# Patient Record
Sex: MALE | Race: WHITE | NOT HISPANIC OR LATINO | Employment: OTHER | URBAN - NONMETROPOLITAN AREA
[De-identification: names, ages, dates, MRNs, and addresses within clinical notes are randomized per-mention and may not be internally consistent; named-entity substitution may affect disease eponyms.]

---

## 2020-09-03 LAB — HCV AB SER-ACNC: NEGATIVE

## 2022-06-23 ENCOUNTER — OFFICE VISIT (OUTPATIENT)
Dept: FAMILY MEDICINE CLINIC | Facility: CLINIC | Age: 57
End: 2022-06-23
Payer: COMMERCIAL

## 2022-06-23 ENCOUNTER — TELEPHONE (OUTPATIENT)
Dept: ADMINISTRATIVE | Facility: OTHER | Age: 57
End: 2022-06-23

## 2022-06-23 VITALS
TEMPERATURE: 97.6 F | BODY MASS INDEX: 41.18 KG/M2 | WEIGHT: 278 LBS | OXYGEN SATURATION: 97 % | HEART RATE: 67 BPM | DIASTOLIC BLOOD PRESSURE: 72 MMHG | SYSTOLIC BLOOD PRESSURE: 132 MMHG | RESPIRATION RATE: 18 BRPM | HEIGHT: 69 IN

## 2022-06-23 DIAGNOSIS — R11.0 NAUSEA: ICD-10-CM

## 2022-06-23 DIAGNOSIS — G47.33 OSA (OBSTRUCTIVE SLEEP APNEA): ICD-10-CM

## 2022-06-23 DIAGNOSIS — E03.9 HYPOTHYROIDISM (ACQUIRED): ICD-10-CM

## 2022-06-23 DIAGNOSIS — E78.2 MIXED HYPERLIPIDEMIA: ICD-10-CM

## 2022-06-23 DIAGNOSIS — R68.81 EARLY SATIETY: ICD-10-CM

## 2022-06-23 DIAGNOSIS — I10 PRIMARY HYPERTENSION: ICD-10-CM

## 2022-06-23 DIAGNOSIS — R41.3 MEMORY LOSS: ICD-10-CM

## 2022-06-23 DIAGNOSIS — N20.0 RENAL STONES: ICD-10-CM

## 2022-06-23 DIAGNOSIS — G89.29 CHRONIC BILATERAL LOW BACK PAIN WITHOUT SCIATICA: ICD-10-CM

## 2022-06-23 DIAGNOSIS — Z11.59 ENCOUNTER FOR HEPATITIS C SCREENING TEST FOR LOW RISK PATIENT: ICD-10-CM

## 2022-06-23 DIAGNOSIS — E53.8 B12 DEFICIENCY: ICD-10-CM

## 2022-06-23 DIAGNOSIS — Z11.4 SCREENING FOR HIV WITHOUT PRESENCE OF RISK FACTORS: ICD-10-CM

## 2022-06-23 DIAGNOSIS — R39.11 URINARY HESITANCY: ICD-10-CM

## 2022-06-23 DIAGNOSIS — E11.42 TYPE 2 DIABETES MELLITUS WITH DIABETIC POLYNEUROPATHY, WITHOUT LONG-TERM CURRENT USE OF INSULIN (HCC): Primary | ICD-10-CM

## 2022-06-23 DIAGNOSIS — M54.50 CHRONIC BILATERAL LOW BACK PAIN WITHOUT SCIATICA: ICD-10-CM

## 2022-06-23 LAB
CREAT UR-MCNC: 137 MG/DL
MICROALBUMIN UR-MCNC: 31.5 MG/L (ref 0–20)
MICROALBUMIN/CREAT 24H UR: 23 MG/G CREATININE (ref 0–30)
SL AMB POCT HEMOGLOBIN AIC: 5.7 (ref ?–6.5)

## 2022-06-23 PROCEDURE — 3075F SYST BP GE 130 - 139MM HG: CPT | Performed by: FAMILY MEDICINE

## 2022-06-23 PROCEDURE — 3044F HG A1C LEVEL LT 7.0%: CPT | Performed by: FAMILY MEDICINE

## 2022-06-23 PROCEDURE — 3078F DIAST BP <80 MM HG: CPT | Performed by: FAMILY MEDICINE

## 2022-06-23 PROCEDURE — 82043 UR ALBUMIN QUANTITATIVE: CPT | Performed by: FAMILY MEDICINE

## 2022-06-23 PROCEDURE — 83036 HEMOGLOBIN GLYCOSYLATED A1C: CPT | Performed by: FAMILY MEDICINE

## 2022-06-23 PROCEDURE — 99205 OFFICE O/P NEW HI 60 MIN: CPT | Performed by: FAMILY MEDICINE

## 2022-06-23 PROCEDURE — 4004F PT TOBACCO SCREEN RCVD TLK: CPT | Performed by: FAMILY MEDICINE

## 2022-06-23 PROCEDURE — 3060F POS MICROALBUMINURIA REV: CPT | Performed by: FAMILY MEDICINE

## 2022-06-23 PROCEDURE — 82570 ASSAY OF URINE CREATININE: CPT | Performed by: FAMILY MEDICINE

## 2022-06-23 PROCEDURE — 3008F BODY MASS INDEX DOCD: CPT | Performed by: FAMILY MEDICINE

## 2022-06-23 RX ORDER — CHLORAL HYDRATE 500 MG
CAPSULE ORAL
COMMUNITY
Start: 2021-12-30 | End: 2022-06-23 | Stop reason: ALTCHOICE

## 2022-06-23 RX ORDER — OXYBUTYNIN CHLORIDE 15 MG/1
TABLET, EXTENDED RELEASE ORAL
COMMUNITY
Start: 2022-02-17

## 2022-06-23 RX ORDER — LEVOTHYROXINE SODIUM 88 UG/1
88 TABLET ORAL DAILY
COMMUNITY
Start: 2022-02-17 | End: 2022-06-23 | Stop reason: SDUPTHER

## 2022-06-23 RX ORDER — LEVOTHYROXINE AND LIOTHYRONINE 9.5; 2.25 UG/1; UG/1
TABLET ORAL DAILY
COMMUNITY
Start: 2009-07-20 | End: 2022-06-23 | Stop reason: ALTCHOICE

## 2022-06-23 RX ORDER — SOLIFENACIN SUCCINATE 5 MG/1
TABLET, FILM COATED ORAL DAILY
COMMUNITY
Start: 2009-07-20 | End: 2022-06-23 | Stop reason: ALTCHOICE

## 2022-06-23 RX ORDER — LEVOTHYROXINE SODIUM 88 UG/1
88 TABLET ORAL DAILY
Qty: 90 TABLET | Refills: 1 | Status: SHIPPED | OUTPATIENT
Start: 2022-06-23

## 2022-06-23 RX ORDER — ZOLPIDEM TARTRATE 12.5 MG/1
TABLET, FILM COATED, EXTENDED RELEASE ORAL
COMMUNITY
Start: 2009-07-20 | End: 2022-06-23 | Stop reason: ALTCHOICE

## 2022-06-23 RX ORDER — NABUMETONE 500 MG/1
TABLET, FILM COATED ORAL 2 TIMES DAILY PRN
COMMUNITY
Start: 2009-12-22 | End: 2022-06-23 | Stop reason: ALTCHOICE

## 2022-06-23 RX ORDER — AMLODIPINE BESYLATE AND ATORVASTATIN CALCIUM 10; 40 MG/1; MG/1
TABLET, FILM COATED ORAL DAILY
COMMUNITY
Start: 2009-07-20 | End: 2022-06-23 | Stop reason: SDUPTHER

## 2022-06-23 RX ORDER — ROSUVASTATIN CALCIUM 40 MG/1
40 TABLET, COATED ORAL
Qty: 90 TABLET | Refills: 1 | Status: SHIPPED | OUTPATIENT
Start: 2022-06-23

## 2022-06-23 RX ORDER — ROSUVASTATIN CALCIUM 40 MG/1
40 TABLET, COATED ORAL DAILY
COMMUNITY
Start: 2021-12-30 | End: 2022-06-23 | Stop reason: SDUPTHER

## 2022-06-23 RX ORDER — HYDROCODONE BITARTRATE AND ACETAMINOPHEN 7.5; 325 MG/1; MG/1
1 TABLET ORAL
COMMUNITY

## 2022-06-23 RX ORDER — AMLODIPINE AND OLMESARTAN MEDOXOMIL 5; 20 MG/1; MG/1
1 TABLET ORAL DAILY
Qty: 90 TABLET | Refills: 1 | Status: SHIPPED | OUTPATIENT
Start: 2022-06-23

## 2022-06-23 RX ORDER — AMLODIPINE AND OLMESARTAN MEDOXOMIL 5; 20 MG/1; MG/1
1 TABLET ORAL DAILY
COMMUNITY
Start: 2022-02-17 | End: 2022-06-23 | Stop reason: SDUPTHER

## 2022-06-23 NOTE — PATIENT INSTRUCTIONS
Mediterranean Diet   AMBULATORY CARE:   A Mediterranean diet  is a meal plan that includes foods that are commonly eaten in countries that border the [place]  This meal plan may provide several health benefits  These include losing or maintaining weight, and decreasing blood pressure, blood sugar, and cholesterol levels  It may also help protect against certain health conditions such as heart disease, cancer, type 2 diabetes, and Alzheimer disease  Work with a dietitian to develop a meal plan that is right for you  Foods to include in the [address] [facility] diet:   Include fruits and vegetables in each meal   Eat a variety of fresh fruits and vegetables  Choose whole grains every day  These foods include whole-grain breads, pastas, and cereals  It also includes brown rice, quinoa, and millet  Use unsaturated fats instead of saturated fats  Cook with olive or canola oil  Limit saturated fats, such as butter, margarine, and shortening  Saturated fat is an unhealthy fat that can increase your cholesterol levels  Choose plant foods, poultry, and fish as your main sources of protein  Eat plant-based foods that provide protein,  such as lentils, beans, chickpeas, nuts, and seeds  Choose mostly plant-based foods in place of meat on most days of the week  Eat protein foods high in omega-3 fats  Fish high in omega-3 fats include salmon, trout, and tuna  Include these types of fish 1 or 2 times each week  Limit fish high in mercury, such as shark, swordfish, tilefish, and leilani mackerel  Omega-3 fats are also found in walnuts and flaxseed  Choose poultry (chicken or turkey)  without skin instead of red meat  Red meat is high in saturated fat  Limit eggs and high-fat meats, such as lowe, sausage, and hot dogs  Choose low-fat dairy foods  such as nonfat or 1% milk, or low-fat almond, cashew, or soy milk  Other examples include low-fat cheese, yogurt, and cottage cheese  Limit sweets  Limit your intake of high-sugar foods, such as soda, desserts, and candy  Talk to your healthcare provider about alcohol  Studies have shown that moderate intake of wine may reduce the risk of heart disease  A moderate amount of wine is 1 serving for women and men 65 years and older each day  Two servings is recommended for men 24to 59years of age each day  A serving of wine is 5 ounces  Other things you need to know if you follow the Mediterranean diet:   Include foods high in iron and vitamin C   Plant-based foods that are high in iron include spinach, beans, tofu, and artichoke  Eat a serving of vitamin C with any iron-rich food to help your body absorb more iron  Examples include oranges, strawberries, cantaloupe, broccoli, and yellow peppers  Get regular physical activity  The Mediterranean diet will have the most benefit if you get regular physical activity  Get 30 minutes of physical activity at least 5 days a week  Choose physical activities that increase your heart rate  Examples include walking, hiking, swimming, and riding a bike  Ask your healthcare provider about the best exercise plan for you  © Copyright [employer] 2022 Information is for End User's use only and may not be sold, redistributed or otherwise used for commercial purposes  All illustrations and images included in CareNotes® are the copyrighted property of [employer]  or David Latham  The above information is an  only  It is not intended as medical advice for individual conditions or treatments  Talk to your doctor, nurse or pharmacist before following any medical regimen to see if it is safe and effective for you

## 2022-06-23 NOTE — LETTER
Procedure Request Form: Colonoscopy      Date Requested: 22  Patient: Josefa Favio  Patient : 1965   Referring Provider: Maria Esther Bloom, DO        Date of Procedure ______________________________       The above patient has informed us that they have completed their   most recent Colonoscopy at your facility  Please complete   this form and attach all corresponding procedure reports/results  Comments __________________________________________________________  ____________________________________________________________________  ____________________________________________________________________  ____________________________________________________________________    Facility Completing Procedure _________________________________________    Form Completed By (print name) _______________________________________      Signature __________________________________________________________      These reports are needed for  compliance  Please fax this completed form and a copy of the procedure report to our office located at [place] [address] as soon as possible to [phone] virgil Bedoya: Phone [phone]    We thank you for your assistance in treating our mutual patient

## 2022-06-23 NOTE — PROGRESS NOTES
Assessment/Plan:    1  Type 2 diabetes mellitus with diabetic polyneuropathy, without long-term current use of insulin (HCC)  - A1C today is 5 7  We discussed monitoring for lows  We will dec Ozempic to 0 25 from 0 5  Cont metformin  He is on board with this  We will monitor for hypoglycemic episodes  On ARB therapy and on statin  MCACR today  Can est with eye doctor here, they are new to area and looking around  - Basic metabolic panel; Future  - Microalbumin / creatinine urine ratio  - NM gastric emptying; Future  - metFORMIN (GLUCOPHAGE) 1000 MG tablet; Take 1 tablet (1,000 mg total) by mouth 2 (two) times a day with meals  Dispense: 180 tablet; Refill: 1  - Semaglutide,0 25 or 0 5MG/DOS, 2 MG/1 5ML SOPN; Inject 0 25 mg under the skin once a week  Dispense: 4 5 mL; Refill: 1    2  ELIZA (obstructive sleep apnea)  - will get plugged into sleep clinic  No acute needs  Does well with PAP  - Ambulatory Referral to Sleep Medicine; Future    3  Mixed hyperlipidemia  - on statin  Discussed the importance of maintaining a healthy lifestyle through heart healthy diet and exercise  - rosuvastatin (CRESTOR) 40 MG tablet; Take 1 tablet (40 mg total) by mouth daily with dinner  Dispense: 90 tablet; Refill: 1    4  Primary hypertension  - stable on regimen  Tolerating  BMI Counseling: Body mass index is 41 05 kg/m²  The BMI is above normal  Nutrition recommendations include decreasing portion sizes, encouraging healthy choices of fruits and vegetables, decreasing fast food intake, consuming healthier snacks, limiting drinks that contain sugar, reducing intake of saturated and trans fat and reducing intake of cholesterol  Exercise recommendations include exercising 3-5 times per week and strength training exercises  Rationale for BMI follow-up plan is due to patient being overweight or obese  Tobacco Cessation Counseling: Tobacco cessation counseling was not provided   The patient is sincerely urged to quit consumption of tobacco  He is not ready to quit tobacco      - amlodipine-olmesartan (MANAS) 5-20 MG; Take 1 tablet by mouth daily  Dispense: 90 tablet; Refill: 1    5  Chronic bilateral low back pain without sciatica  - stable  Has been on opioids in the past   Uses [facility] and has a card for this  If needs opioids understands our policy  6  Hypothyroidism (acquired)  - stable  - TSH, 3rd generation with Free T4 reflex; Future  - levothyroxine 88 mcg tablet; Take 1 tablet (88 mcg total) by mouth daily  Dispense: 90 tablet; Refill: 1    7  Memory loss  - will check labs  Will monitor; very mild  Can consider MoCA in the future  - CBC and differential; Future    8  B12 deficiency  - Vitamin B12; Future    9  Renal stones  - Urology referral   Was seeing in [place]; has significant history  - Ambulatory Referral to Urology; Future    10  Early satiety  With nausea over the past couple of months  Is losing weight but moreso of late  Will check a gastric emptying study  No reflux symptoms to report  We can cont to w/u pending findings     - NM gastric emptying; Future  - Lipase; Future    11  Urinary hesitancy  PSA check  He will see urology for stones; can certainly have this addressed as well  Is on oxybutynin but does not take routinely  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 SE of Ozempic     - Lipase; Future    RTC in 3 months to recap given number of items discussed  Patient/Caretaker verbalized understanding and were in agreement with today's assessment and plan  Time was taken to address any questions patient/caretaker had  Indication/Risks/Benefits of medication(s) as prescribed were discussed with the patient/caretaker  The patient verbalized understanding and agreement and elects to take medications as prescribed    Time was taken to answer any questions the patient/caretaker may have had  Will get est with eye care  Total time I spent caring for this patient on the day of the encounter - 60 minutes  15 minutes spent before the visit  30 minutes spent during the visit  15 minutes spent after the visit       Chief Complaint   [facility]   [facility]     NAUSEA ALL THE TIME COMES AND GO A FEW TIME A DAY AND SOME ALL DAY LONG  MEMORY NOT GOOD  Subjective:      Patient ID: Sana Landin is a 59 y o  male  He is with chronic nausea - a couple of months  At times he does feel like he is going to vomit  At times this happens in the middle of eating  No belly pain  No diarrhea/no changes in his stools  Had Colonoscopy a few years ago and tells me this was good for 10 years  Tells me did have EGD as well due to reflux -- he is not longer on medicine for this  Admits to not feeling hungry often and feeling full, quickly  Losing memory a bit more -- worsening since he is retired  Not getting lost driving but lost in conversation  Worse with conversation  He Is not getting lost in public  Just his wife noticed this  There is no FHx of Alzheimer's     Chronic back pain- does [facility] and has card  Used to take opioid but is no longer  He understands he will need CSA if needs Rx for opioid  His pain is stable today  Tells me he had w/u for this in [place]    Diabetes - well controlled, eye exam up-to-date per his report, does need a foot exam, microalbumin negative in the fall  He Is on Ozempic and has lost about 80# since this time  He does tell me since he is with nausea, he is having a bit more nausea  Hypertension-on meds - tolerating this  HLD - on statin and tolerating this  No chest pain, shortness of breath, lightheadedness, or exercise intolerance  Insomnia-used to work 3rd shift, had Ambien to allow him to sleep during the day, does not take anymore  Was a         ELIZA- on CPAP    Hypothyroidism-No hair or skin changes, diarrhea or constipation, weight changes for no reason  Last TSH normal    H/o elev LFTs - fatty liver per report  Osteoarthritis of both knees- used to get injections, doing well currently       The following portions of the patient's history were reviewed and updated as appropriate: allergies, current medications, past family history, past medical history, past social history, past surgical history and problem list     Review of Systems   All other systems reviewed and are negative  Objective:      /72 (BP Location: Left arm, Patient Position: Sitting, Cuff Size: Large)   Pulse 67   Temp 97 6 °F (36 4 °C) (Temporal)   Resp 18   Ht 5' 9" (1 753 m)   Wt 126 kg (278 lb)   SpO2 97%   BMI 41 05 kg/m²          Physical Exam  Vitals and nursing note reviewed  Constitutional:       General: He is not in acute distress  Appearance: Normal appearance  He is obese  He is not ill-appearing  HENT:      Head: Normocephalic and atraumatic  Eyes:      Conjunctiva/sclera: Conjunctivae normal    Cardiovascular:      Rate and Rhythm: Normal rate and regular rhythm  Pulmonary:      Effort: Pulmonary effort is normal       Breath sounds: Normal breath sounds  No wheezing, rhonchi or rales  Musculoskeletal:      Cervical back: Neck supple  Skin:     General: Skin is warm and dry  Neurological:      General: No focal deficit present  Mental Status: He is alert and oriented to person, place, and time  Psychiatric:         Mood and Affect: Mood normal          Behavior: Behavior normal          Thought Content:  Thought content normal          Judgment: Judgment normal

## 2022-06-23 NOTE — TELEPHONE ENCOUNTER
----- Message from Rosa Linda sent at 6/23/2022  9:37 AM EDT -----  Regarding: Care Gap Request  06/23/22 9:37 AM    Hello, our patient attached above has had colonoscopy completed/performed  Please assist in updating the patient chart   The date of service is 2/2020, under care everywhere    Thank you,  Rosa Linda  [facility]

## 2022-06-24 NOTE — TELEPHONE ENCOUNTER
Upon review of the In Basket request and the patient's chart, initial outreach has been made via fax, please see Contacts section for details       Thank you  Paola Ricks

## 2022-07-06 NOTE — TELEPHONE ENCOUNTER
Upon review of the In Basket request we were able to locate, review, and update the patient chart as requested for CRC: Colonoscopy  Any additional questions or concerns should be emailed to the Practice Liaisons via [email or web address] email, please do not reply via In Basket      Thank you  Jremain Kelly

## 2022-07-20 ENCOUNTER — HOSPITAL ENCOUNTER (OUTPATIENT)
Dept: NUCLEAR MEDICINE | Facility: HOSPITAL | Age: 57
Discharge: HOME | End: 2022-07-20
Attending: FAMILY MEDICINE
Payer: COMMERCIAL

## 2022-07-20 DIAGNOSIS — R68.81 EARLY SATIETY: ICD-10-CM

## 2022-07-20 DIAGNOSIS — E11.42 TYPE 2 DIABETES MELLITUS WITH DIABETIC POLYNEUROPATHY, WITHOUT LONG-TERM CURRENT USE OF INSULIN (HCC): ICD-10-CM

## 2022-07-20 PROCEDURE — 78264 GASTRIC EMPTYING IMG STUDY: CPT

## 2022-07-20 PROCEDURE — A9541 TC99M SULFUR COLLOID: HCPCS

## 2022-07-22 ENCOUNTER — TELEPHONE (OUTPATIENT)
Dept: FAMILY MEDICINE CLINIC | Facility: CLINIC | Age: 57
End: 2022-07-22

## 2022-07-22 NOTE — TELEPHONE ENCOUNTER
Pt is aware he that the nausea started before he started taking his ozempic, but he will discuss at f/u appt

## 2022-07-23 ENCOUNTER — TELEPHONE (OUTPATIENT)
Dept: OTHER | Facility: OTHER | Age: 57
End: 2022-07-23

## 2022-07-23 NOTE — TELEPHONE ENCOUNTER
[facility] calling to report a critical lab value ordered by Dr Pao Carolina, states that he must give call back number and reference to give to provider directly  Reference#:[id number]    Paged on call via TC

## 2022-07-24 LAB
BASOPHILS # BLD AUTO: 39 CELLS/UL (ref 0–200)
BASOPHILS NFR BLD AUTO: 0.5 %
BUN SERPL-MCNC: 13 MG/DL (ref 7–25)
BUN/CREAT SERPL: 21 (CALC) (ref 6–22)
CALCIUM SERPL-MCNC: 9.3 MG/DL (ref 8.6–10.3)
CHLORIDE SERPL-SCNC: 106 MMOL/L (ref 98–110)
CO2 SERPL-SCNC: 26 MMOL/L (ref 20–32)
CREAT SERPL-MCNC: 0.62 MG/DL (ref 0.7–1.3)
EOSINOPHIL # BLD AUTO: 94 CELLS/UL (ref 15–500)
EOSINOPHIL NFR BLD AUTO: 1.2 %
ERYTHROCYTE [DISTWIDTH] IN BLOOD BY AUTOMATED COUNT: 13.1 % (ref 11–15)
GFR/BSA.PRED SERPLBLD CYS-BASED-ARV: 110 ML/MIN/1.73M2
GLUCOSE SERPL-MCNC: 93 MG/DL (ref 65–99)
HCT VFR BLD AUTO: 42.9 % (ref 38.5–50)
HGB BLD-MCNC: 14.3 G/DL (ref 13.2–17.1)
LIPASE SERPL-CCNC: 224 U/L (ref 7–60)
LYMPHOCYTES # BLD AUTO: 2496 CELLS/UL (ref 850–3900)
LYMPHOCYTES NFR BLD AUTO: 32 %
MCH RBC QN AUTO: 31.6 PG (ref 27–33)
MCHC RBC AUTO-ENTMCNC: 33.3 G/DL (ref 32–36)
MCV RBC AUTO: 94.9 FL (ref 80–100)
MONOCYTES # BLD AUTO: 585 CELLS/UL (ref 200–950)
MONOCYTES NFR BLD AUTO: 7.5 %
NEUTROPHILS # BLD AUTO: 4586 CELLS/UL (ref 1500–7800)
NEUTROPHILS NFR BLD AUTO: 58.8 %
PLATELET # BLD AUTO: 248 THOUSAND/UL (ref 140–400)
PMV BLD REES-ECKER: 9.8 FL (ref 7.5–12.5)
POTASSIUM SERPL-SCNC: 4.7 MMOL/L (ref 3.5–5.3)
PSA FREE MFR SERPL: 20 % (CALC)
PSA FREE SERPL-MCNC: 0.1 NG/ML
PSA SERPL-MCNC: 0.5 NG/ML
RBC # BLD AUTO: 4.52 MILLION/UL (ref 4.2–5.8)
SODIUM SERPL-SCNC: 139 MMOL/L (ref 135–146)
TSH SERPL-ACNC: 1.75 MIU/L (ref 0.4–4.5)
VIT B12 SERPL-MCNC: 799 PG/ML (ref 200–1100)
WBC # BLD AUTO: 7.8 THOUSAND/UL (ref 3.8–10.8)

## 2022-07-25 ENCOUNTER — TELEPHONE (OUTPATIENT)
Dept: ADMINISTRATIVE | Facility: OTHER | Age: 57
End: 2022-07-25

## 2022-07-25 NOTE — TELEPHONE ENCOUNTER
Upon review of the In Basket request we were able to locate, review, and update the patient chart as requested for Hepatitis C   Any additional questions or concerns should be emailed to the Practice Liaisons via [email or web address] email, please do not reply via In Basket      Thank you  Tanner Rios MA

## 2022-07-25 NOTE — TELEPHONE ENCOUNTER
----- Message from Lambert Nowak sent at 7/24/2022  7:55 AM EDT -----  Regarding: Care Gap Request  07/24/22 7:55 AM    Hello, our patient attached above has had Hepatitis C completed/performed  Please assist in updating the patient chart by pulling the Care Everywhere (CE) document  The date of service is 2021           Thank you,  Jerry Whitley MA  [facility] PRIMARY CARE

## 2022-07-26 ENCOUNTER — TELEPHONE (OUTPATIENT)
Dept: FAMILY MEDICINE CLINIC | Facility: CLINIC | Age: 57
End: 2022-07-26

## 2022-07-26 NOTE — TELEPHONE ENCOUNTER
Lab Results   Component Value Date    HGBA1C 5 7 06/23/2022     This was checked in-house at his visit with me and was 5 7 which was the reason we decreased his Ozempic        Gatito Rogers DO

## 2022-08-01 ENCOUNTER — TELEPHONE (OUTPATIENT)
Dept: FAMILY MEDICINE CLINIC | Facility: CLINIC | Age: 57
End: 2022-08-01

## 2022-09-14 ENCOUNTER — TELEPHONE (OUTPATIENT)
Dept: FAMILY MEDICINE CLINIC | Facility: CLINIC | Age: 57
End: 2022-09-14

## 2022-09-30 ENCOUNTER — OFFICE VISIT (OUTPATIENT)
Dept: FAMILY MEDICINE CLINIC | Facility: CLINIC | Age: 57
End: 2022-09-30
Payer: COMMERCIAL

## 2022-09-30 VITALS
HEART RATE: 80 BPM | DIASTOLIC BLOOD PRESSURE: 88 MMHG | HEIGHT: 69 IN | WEIGHT: 289 LBS | OXYGEN SATURATION: 96 % | BODY MASS INDEX: 42.8 KG/M2 | TEMPERATURE: 98 F | SYSTOLIC BLOOD PRESSURE: 138 MMHG | RESPIRATION RATE: 18 BRPM

## 2022-09-30 DIAGNOSIS — L81.8 HEMOSIDERIN PIGMENTATION OF LOWER EXTREMITY DUE TO VARICOSE VEINS: ICD-10-CM

## 2022-09-30 DIAGNOSIS — Z11.4 SCREENING FOR HIV (HUMAN IMMUNODEFICIENCY VIRUS): ICD-10-CM

## 2022-09-30 DIAGNOSIS — K85.30 DRUG-INDUCED ACUTE PANCREATITIS, UNSPECIFIED COMPLICATION STATUS: Primary | ICD-10-CM

## 2022-09-30 DIAGNOSIS — R41.3 MEMORY LOSS: ICD-10-CM

## 2022-09-30 DIAGNOSIS — Z00.00 ANNUAL PHYSICAL EXAM: ICD-10-CM

## 2022-09-30 DIAGNOSIS — E03.9 HYPOTHYROIDISM (ACQUIRED): ICD-10-CM

## 2022-09-30 DIAGNOSIS — E78.2 MIXED HYPERLIPIDEMIA: ICD-10-CM

## 2022-09-30 DIAGNOSIS — E11.42 TYPE 2 DIABETES MELLITUS WITH DIABETIC POLYNEUROPATHY, WITHOUT LONG-TERM CURRENT USE OF INSULIN (HCC): ICD-10-CM

## 2022-09-30 DIAGNOSIS — I83.899 HEMOSIDERIN PIGMENTATION OF LOWER EXTREMITY DUE TO VARICOSE VEINS: ICD-10-CM

## 2022-09-30 DIAGNOSIS — I10 PRIMARY HYPERTENSION: ICD-10-CM

## 2022-09-30 DIAGNOSIS — R79.89 ELEVATED LFTS: ICD-10-CM

## 2022-09-30 LAB — SL AMB POCT HEMOGLOBIN AIC: 5.9 (ref ?–6.5)

## 2022-09-30 PROCEDURE — 83036 HEMOGLOBIN GLYCOSYLATED A1C: CPT | Performed by: FAMILY MEDICINE

## 2022-09-30 PROCEDURE — 99214 OFFICE O/P EST MOD 30 MIN: CPT | Performed by: FAMILY MEDICINE

## 2022-09-30 PROCEDURE — 99396 PREV VISIT EST AGE 40-64: CPT | Performed by: FAMILY MEDICINE

## 2022-09-30 RX ORDER — LEVOTHYROXINE SODIUM 88 UG/1
88 TABLET ORAL DAILY
Qty: 90 TABLET | Refills: 1 | Status: SHIPPED | OUTPATIENT
Start: 2022-09-30

## 2022-09-30 RX ORDER — ROSUVASTATIN CALCIUM 40 MG/1
40 TABLET, COATED ORAL
Qty: 90 TABLET | Refills: 1 | Status: SHIPPED | OUTPATIENT
Start: 2022-09-30

## 2022-09-30 RX ORDER — AMLODIPINE AND OLMESARTAN MEDOXOMIL 5; 20 MG/1; MG/1
1 TABLET ORAL DAILY
Qty: 90 TABLET | Refills: 1 | Status: SHIPPED | OUTPATIENT
Start: 2022-09-30

## 2022-09-30 NOTE — PATIENT INSTRUCTIONS

## 2022-09-30 NOTE — PROGRESS NOTES
[facility] PRACT[facility]    NAME: Satish Deshpande  AGE: 59 y o  SEX: male  : 1965     DATE: 10/2/2022     Assessment and Plan:     1  Type 2 diabetes mellitus with diabetic polyneuropathy, without long-term current use of insulin ([address] )  - he is OFF Ozempic due to Pancreatitis  We will also stop his metformin  Cont to have have nausea and A1C is supberb  He will be diet controlled, we will need to closely monitor this  Narda Laughter is utd  On ARB and Statin therapy  Has Diab Eye exam coming up in the near future  Lab Results   Component Value Date    HGBA1C 5 9 2022   - POCT hemoglobin A1c  - Comprehensive metabolic panel; Future    2  Drug-induced acute pancreatitis, unspecified complication status  - will recheck labs  He is feeling close to his baseline but still with some nausea  Is no longer losing weight at a rapid pace, has gained  He agrees to recheck labs  He did not have abdominal pain, just nausea, fatigue, significant weight loss with the medication  Will further discuss plan, pending     - Lipase; Future    3  Mixed hyperlipidemia  Discussed the importance of maintaining a healthy lifestyle through heart healthy diet and exercise  - Lipid Panel with Direct LDL reflex; Future  - rosuvastatin (CRESTOR) 40 MG tablet; Take 1 tablet (40 mg total) by mouth daily with dinner  Dispense: 90 tablet; Refill: 1    4  Primary hypertension  - cont regimen  Discussed the importance of maintaining a healthy lifestyle through heart healthy diet and exercise  - Comprehensive metabolic panel; Future  - amlodipine-olmesartan (MANAS) 5-20 MG; Take 1 tablet by mouth daily  Dispense: 90 tablet; Refill: 1    5  Screening for HIV (human immunodeficiency virus)  - HIV 1/2 Antigen/Antibody (4th Generation) w Reflex [facility]; Future    6  Memory loss  - his wife is worried about this    In lieu of time today, will have him RTC for a cognitive evaluation  Will do additional labs as well  7  Elevated LFTs  - will recheck and pursue w/u pending findings  8  Hemosiderin pigmentation of lower extremity due to varicose veins  Reassurance given  Will monitor this  9  Hypothyroidism (acquired)  Stable  - levothyroxine 88 mcg tablet; Take 1 tablet (88 mcg total) by mouth daily  Dispense: 90 tablet; Refill: 1    10  Annual physical exam  - brought utd based on shared decision making  RTC for cognitive evaluation  Immunizations and preventive care screenings were discussed with patient today  Appropriate education was printed on patient's after visit summary  Counseling:  Alcohol/drug use: discussed moderation in alcohol intake, the recommendations for healthy alcohol use, and avoidance of illicit drug use  Dental Health: discussed importance of regular tooth brushing, flossing, and dental visits  Injury prevention: discussed safety/seat belts, safety helmets, smoke detectors, carbon dioxide detectors, and smoking near bedding or upholstery  Sexual health: discussed sexually transmitted diseases, partner selection, use of condoms, avoidance of unintended pregnancy, and contraceptive alternatives  · Exercise: the importance of regular exercise/physical activity was discussed  Recommend exercise 3-5 times per week for at least 30 minutes  BMI Counseling: Body mass index is 42 68 kg/m²  The BMI is above normal  Nutrition recommendations include decreasing portion sizes, encouraging healthy choices of fruits and vegetables, decreasing fast food intake, consuming healthier snacks, limiting drinks that contain sugar, reducing intake of saturated and trans fat and reducing intake of cholesterol  Exercise recommendations include exercising 3-5 times per week and strength training exercises  Rationale for BMI follow-up plan is due to patient being overweight or obese       Depression Screening and Follow-up Plan: Patient was screened for depression during today's encounter  They screened negative with a PHQ-2 score of 0  Tobacco Cessation Counseling: Tobacco cessation counseling was provided  The patient is sincerely urged to quit consumption of tobacco  He is not ready to quit tobacco  He is smoking cigars and MINIMALLY         Return for needs 30 min visit for cognitive testing -- memory loss when they want to schedule it   Chief Complaint:     Chief Complaint   Patient presents with    Follow-up      History of Present Illness:     Adult Annual Physical     Patient here for a comprehensive physical exam  The patient reports:      He cont to have nausea but this is 75% better than when on Ozempic  We stopped this at the end of July, after his pancreatic enzyme returned  He has no abdominal pain, no longer losing weight  His appetite is much better since being off this medicine  Still taking metformin 1000 mg po bid  Type 2 diabetes mellitus with diabetic polyneuropathy, without long-term current use of insulin ([facility])  - A1C in June was 5 7  STOPPED Ozempic  His BG, when he checks, is mainly less than 100 or low 100s  - eye exam is in 2 weeks   Conts on metformin coming here today  Is taking stating and ARB therapy  ELIZA (obstructive sleep apnea)  - has appt upcoming  He cont on his machine  Mixed hyperlipidemia  - on statin - he is tolerating this  He is watching his diet for the most part  He is not intentionally exercising  He is doing his ADLs  He has a bad back and has to rest with this  Primary hypertension  - stable on regimen  No cp, sob, edema      H/o Renal stones - passed one recently without issues  Urology referral placed in June  Has not gotten set up with them  Will need to see where we are on the status of this  Tobacco -- occasional cigars -- down from 4/day  Alcohol --  none     Chronic bilateral low back pain without sciatica  - stable    He is not taking anything for his pain  Wife uses medical [facility], but he does not  Hypothyroidism (acquired)  - stable  B12 deficiency  - taking supplement  Vitamin B12; Future     Urinary hesitancy  PSA checked  OK  Needs to see urology  Used to take oxybutynin but no longer does  Was much more helpful when he was   He is retired  Fatty Liver Dz -- elev LFTs  - tells me where he came from, they wanted to do a further w/u for this, he did not end up pursuing this  Diet and Physical Activity  · Diet/Nutrition: needs to work on this  · Exercise: no formal exercise  Depression Screening  PHQ-2/9 Depression Screening    Little interest or pleasure in doing things: 0 - not at all  Feeling down, depressed, or hopeless: 0 - not at all  Trouble falling or staying asleep, or sleeping too much: 0 - not at all  Feeling tired or having little energy: 0 - not at all  Poor appetite or overeatin - not at all  Feeling bad about yourself - or that you are a failure or have let yourself or your family down: 0 - not at all  Trouble concentrating on things, such as reading the newspaper or watching television: 0 - not at all  Moving or speaking so slowly that other people could have noticed  Or the opposite - being so fidgety or restless that you have been moving around a lot more than usual: 0 - not at all  Thoughts that you would be better off dead, or of hurting yourself in some way: 0 - not at all  PHQ-2 Score: 0  PHQ-2 Interpretation: Negative depression screen  PHQ-9 Score: 0   PHQ-9 Interpretation: No or Minimal depression        General Health  · Sleep: sleeps well  · Hearing: no major issues  · Vision: vision problems: has glasses  has upcoming appt  · Dental: taking care of his teeth and seeing dentist       Health  · Symptoms include: weak stream, some hesitancy  Review of Systems:     Review of Systems   All other systems reviewed and are negative       Past Medical History:     Past Medical History:   Diagnosis Date    Chronic bilateral low back pain without sciatica     Cigar smoker     Fatty liver     Hypertension     Memory change     Mixed hyperlipidemia     Non-insulin dependent type 2 diabetes mellitus ([address] )     Obstructive sleep apnea       Past Surgical History:     History reviewed  No pertinent surgical history  Family History:     Family History   Problem Relation Age of Onset    Stroke Mother     Heart disease Father       Social History:     Social History     Socioeconomic History    Marital status: /Civil Union     Spouse name: None    Number of children: None    Years of education: None    Highest education level: None   Occupational History    None   Tobacco Use    Smoking status: Current Every Day Smoker     Types: Cigars    Smokeless tobacco: Never Used   Vaping Use    Vaping Use: Never used   Substance and Sexual Activity    Alcohol use: Not Currently    Drug use: Not Currently     Types: Marijuana    Sexual activity: Yes     Partners: Female     Comment:    Other Topics Concern    None   Social History Narrative    None     Social Determinants of Health     Financial Resource Strain: Low Risk     Difficulty of Paying Living Expenses: Not hard at all   Food Insecurity: No Food Insecurity    Worried About Running Out of Food in the Last Year: Never true    Artis of Food in the Last Year: Never true   Transportation Needs: No Transportation Needs    Lack of Transportation (Medical): No    Lack of Transportation (Non-Medical): No   Physical Activity: Inactive    Days of Exercise per Week: 5 days    Minutes of Exercise per Session: 0 min   Stress: No Stress Concern Present    Feeling of Stress : Not at all   Social Connections:  Moderately Isolated    Frequency of Communication with Friends and Family: More than three times a week    Frequency of Social Gatherings with Friends and Family: Once a week    Attends Voodoo Services: Never    Active Member of Clubs or Organizations: No    Attends Club or Organization Meetings: Never    Marital Status:    Intimate Partner Violence: Not At Risk    Fear of Current or Ex-Partner: No    Emotionally Abused: No    Physically Abused: No    Sexually Abused: No   Housing Stability: Low Risk     Unable to Pay for Housing in the Last Year: No    Number of [place] in the Last Year: 1    Unstable Housing in the Last Year: No      Current Medications:     Current Outpatient Medications   Medication Sig Dispense Refill    amlodipine-olmesartan (MANAS) 5-20 MG Take 1 tablet by mouth daily 90 tablet 1    Cholecalciferol 50 MCG (2000 UT) CAPS Take by mouth daily      cyanocobalamin (VITAMIN B-12) 100 MCG tablet Take by mouth      levothyroxine 88 mcg tablet Take 1 tablet (88 mcg total) by mouth daily 90 tablet 1    Multiple Vitamin (Multi-Vitamin) tablet Take 1 tablet by mouth daily      rosuvastatin (CRESTOR) 40 MG tablet Take 1 tablet (40 mg total) by mouth daily with dinner 90 tablet 1     No current facility-administered medications for this visit  Allergies: Allergies   Allergen Reactions    Codeine Other (See Comments)     Reaction Date: [id number];     Morphine And Related GI Intolerance     nauseated  Other reaction(s): GI Intolerance  Other reaction(s): GI Intolerance        Physical Exam:     /88 (BP Location: Right arm, Patient Position: Sitting, Cuff Size: Large)   Pulse 80   Temp 98 °F (36 7 °C) (Temporal)   Resp 18   Ht 5' 9" (1 753 m)   Wt 131 kg (289 lb)   SpO2 96%   BMI 42 68 kg/m²     Physical Exam  Vitals and nursing note reviewed  Constitutional:       Appearance: Normal appearance  He is obese  HENT:      Head: Normocephalic and atraumatic        Right Ear: Tympanic membrane and ear canal normal       Left Ear: Tympanic membrane and ear canal normal       Nose: Nose normal       Mouth/Throat:      Mouth: Mucous membranes are moist       Pharynx: Oropharynx is clear  Eyes:      Conjunctiva/sclera: Conjunctivae normal       Pupils: Pupils are equal, round, and reactive to light  Cardiovascular:      Rate and Rhythm: Normal rate and regular rhythm  Pulses: no weak pulses          Dorsalis pedis pulses are 2+ on the right side and 2+ on the left side  Pulmonary:      Effort: Pulmonary effort is normal       Breath sounds: Normal breath sounds  No wheezing, rhonchi or rales  Abdominal:      General: Bowel sounds are normal       Palpations: Abdomen is soft  Musculoskeletal:      Cervical back: Neck supple  Feet:      Right foot:      Skin integrity: No ulcer, skin breakdown, erythema, warmth, callus or dry skin  Left foot:      Skin integrity: Callus (L great toe) present  No ulcer, skin breakdown, erythema, warmth or dry skin  Lymphadenopathy:      Cervical: No cervical adenopathy  Skin:     General: Skin is warm and dry  Neurological:      General: No focal deficit present  Mental Status: He is alert and oriented to person, place, and time  Psychiatric:         Mood and Affect: Mood normal          Behavior: Behavior normal          Thought Content: Thought content normal          Judgment: Judgment normal           Diabetic Foot Exam    Patient's shoes and socks removed  Right Foot/Ankle   Right Foot Inspection  Skin Exam: skin normal and skin intact  No dry skin, no warmth, no callus, no erythema, no maceration, no abnormal color, no pre-ulcer, no ulcer and no callus  Toe Exam: ROM and strength within normal limits  Sensory   Monofilament testing: diminished    Vascular  Capillary refills: < 3 seconds  The right DP pulse is 2+  Left Foot/Ankle  Left Foot Inspection  Skin Exam: skin normal, skin intact and callus (L great toe)  No dry skin, no warmth, no erythema, no maceration, normal color, no pre-ulcer and no ulcer  Toe Exam: ROM and strength within normal limits       Sensory Monofilament testing: diminished    Vascular  Capillary refills: < 3 seconds  The left DP pulse is 2+       Assign Risk Category  No deformity present  Loss of protective sensation  No weak pulses  Risk: 1      DO Jimmy Ratliff

## 2022-10-05 LAB
ALBUMIN SERPL-MCNC: 4.3 G/DL (ref 3.6–5.1)
ALBUMIN/GLOB SERPL: 1.5 (CALC) (ref 1–2.5)
ALP SERPL-CCNC: 95 U/L (ref 35–144)
ALT SERPL-CCNC: 68 U/L (ref 9–46)
AST SERPL-CCNC: 53 U/L (ref 10–35)
BILIRUB SERPL-MCNC: 0.5 MG/DL (ref 0.2–1.2)
BUN SERPL-MCNC: 13 MG/DL (ref 7–25)
BUN/CREAT SERPL: 21 (CALC) (ref 6–22)
CALCIUM SERPL-MCNC: 9.6 MG/DL (ref 8.6–10.3)
CHLORIDE SERPL-SCNC: 104 MMOL/L (ref 98–110)
CHOLEST SERPL-MCNC: 130 MG/DL
CHOLEST/HDLC SERPL: 3 (CALC)
CO2 SERPL-SCNC: 28 MMOL/L (ref 20–32)
CREAT SERPL-MCNC: 0.61 MG/DL (ref 0.7–1.3)
GFR/BSA.PRED SERPLBLD CYS-BASED-ARV: 111 ML/MIN/1.73M2
GLOBULIN SER CALC-MCNC: 2.9 G/DL (CALC) (ref 1.9–3.7)
GLUCOSE SERPL-MCNC: 125 MG/DL (ref 65–99)
HDLC SERPL-MCNC: 43 MG/DL
LDLC SERPL CALC-MCNC: 66 MG/DL (CALC)
LIPASE SERPL-CCNC: 24 U/L (ref 7–60)
NONHDLC SERPL-MCNC: 87 MG/DL (CALC)
POTASSIUM SERPL-SCNC: 4.5 MMOL/L (ref 3.5–5.3)
PROT SERPL-MCNC: 7.2 G/DL (ref 6.1–8.1)
SODIUM SERPL-SCNC: 139 MMOL/L (ref 135–146)
TRIGL SERPL-MCNC: 133 MG/DL

## 2022-10-12 LAB
LEFT EYE DIABETIC RETINOPATHY: NORMAL
LEFT EYE DIABETIC RETINOPATHY: NORMAL
RIGHT EYE DIABETIC RETINOPATHY: NORMAL
RIGHT EYE DIABETIC RETINOPATHY: NORMAL
SEVERITY (EYE EXAM): NORMAL

## 2022-10-19 ENCOUNTER — OFFICE VISIT (OUTPATIENT)
Dept: FAMILY MEDICINE CLINIC | Facility: CLINIC | Age: 57
End: 2022-10-19
Payer: COMMERCIAL

## 2022-10-19 VITALS
WEIGHT: 292 LBS | DIASTOLIC BLOOD PRESSURE: 70 MMHG | BODY MASS INDEX: 43.25 KG/M2 | TEMPERATURE: 98.8 F | SYSTOLIC BLOOD PRESSURE: 124 MMHG | RESPIRATION RATE: 18 BRPM | HEART RATE: 72 BPM | HEIGHT: 69 IN | OXYGEN SATURATION: 96 %

## 2022-10-19 DIAGNOSIS — R68.89 FORGETFULNESS: Primary | ICD-10-CM

## 2022-10-19 DIAGNOSIS — E11.42 TYPE 2 DIABETES MELLITUS WITH DIABETIC POLYNEUROPATHY, WITHOUT LONG-TERM CURRENT USE OF INSULIN (HCC): ICD-10-CM

## 2022-10-19 DIAGNOSIS — K76.0 FATTY LIVER DISEASE, NONALCOHOLIC: ICD-10-CM

## 2022-10-19 PROBLEM — R79.89 ELEVATED LFTS: Status: RESOLVED | Noted: 2022-09-30 | Resolved: 2022-10-19

## 2022-10-19 PROCEDURE — 99214 OFFICE O/P EST MOD 30 MIN: CPT | Performed by: FAMILY MEDICINE

## 2022-10-19 NOTE — PROGRESS NOTES
Name: Morena Costello      : 1965      MRN: [id number]  Encounter Provider: Nettie Bailey DO  Encounter Date: 10/19/2022   Encounter department: [address] [place] [address]     1  Type 2 diabetes mellitus with diabetic polyneuropathy, without long-term current use of insulin (HCC)  - stable  Has done well  Is back on metformin as his BG were running higher than he would like  Ozempic stopped --> Pancreatitis  Lab Results   Component Value Date    HGBA1C 5 9 2022       2  Fatty liver disease, nonalcoholic  Will monitor  Can get US if worsening  Lifestyle modifications emphasized  3  Forgetfulness  - MOCA today    We discussed slowing down, being in the moment, making lists, not relying on his wife as much for things, taking some ownership of appts, etc   He has crossword puzzles and other brain stimulating things that his wife got him  Labs have looked OK too  No focal neurological deficits  Not on meds that should cause memory impairment  We will monitor this closely and can redo testing/administer other screens, if necessary  RTC in 3 months for CDM, sooner prn    Patient/Caretaker verbalized understanding and were in agreement with today's assessment and plan  Time was taken to address any questions patient/caretaker had  Indication/Risks/Benefits of medication(s) as prescribed were discussed with the patient/caretaker  The patient verbalized understanding and agreement and elects to take medications as prescribed  Time was taken to answer any questions the patient/caretaker may have had  Total time I spent caring for this patient on the day of the encounter - 40 minutes  5 minutes spent before the visit  30 minutes spent during the visit  5 minutes spent after the visit        Chief Complaint   Patient presents with   • Follow-up     Tested for memory loss        Subjective      Here today for cognitive evaluation  He is becoming more forgetful, over time  He has 12th grade education  He relies on his wife to remind him of daily activities  He tells me he does not forget major things but will forget things mid sentence, short term recall, what he might have coming up in terms of activities, get to the store - forget why he went, not remember to look at his list, etc   He admits to rushing, often, not always being in the moment  He is retired  Had similar issues when working, never struggled to do his job  Was a , never got lost   Cont to know where he is, where he is going, etc   Does not forget names of close loved ones; etc   His wife bought him [employer] and other brain stimulating books which he does  He and his wife are very close  Moved here not long ago from [place]  He does have DM, this is very well controlled  Is compliant with labs/meds, etc       Review of Systems   All other systems reviewed and are negative  Current Outpatient Medications on File Prior to Visit   Medication Sig   • amlodipine-olmesartan (MANAS) 5-20 MG Take 1 tablet by mouth daily   • Cholecalciferol 50 MCG (2000 UT) CAPS Take by mouth daily   • cyanocobalamin (VITAMIN B-12) 100 MCG tablet Take by mouth   • levothyroxine 88 mcg tablet Take 1 tablet (88 mcg total) by mouth daily   • metFORMIN (GLUCOPHAGE) 1000 MG tablet Take 1,000 mg by mouth 2 (two) times a day with meals   • Multiple Vitamin (Multi-Vitamin) tablet Take 1 tablet by mouth daily   • rosuvastatin (CRESTOR) 40 MG tablet Take 1 tablet (40 mg total) by mouth daily with dinner       Objective     /70 (BP Location: Left arm, Patient Position: Sitting, Cuff Size: Large)   Pulse 72   Temp 98 8 °F (37 1 °C) (Temporal)   Resp 18   Ht 5' 9" (1 753 m)   Wt 132 kg (292 lb)   SpO2 96%   BMI 43 12 kg/m²     Physical Exam  Vitals and nursing note reviewed  Constitutional:       General: He is not in acute distress  Appearance: Normal appearance   He is not ill-appearing  HENT:      Head: Normocephalic and atraumatic  Eyes:      Conjunctiva/sclera: Conjunctivae normal       Pupils: Pupils are equal, round, and reactive to light  Cardiovascular:      Rate and Rhythm: Normal rate  Pulmonary:      Effort: Pulmonary effort is normal    Musculoskeletal:         General: No deformity  Skin:     General: Skin is warm and dry  Neurological:      General: No focal deficit present  Mental Status: He is oriented to person, place, and time  Motor: No weakness  Gait: Gait normal       Comments: See MOCA --> scanned into chart  Psychiatric:         Mood and Affect: Mood normal          Behavior: Behavior normal          Thought Content:  Thought content normal          Judgment: Judgment normal        Shanae Castellon DO

## 2022-11-28 DIAGNOSIS — I10 PRIMARY HYPERTENSION: ICD-10-CM

## 2022-11-28 RX ORDER — AMLODIPINE AND OLMESARTAN MEDOXOMIL 5; 20 MG/1; MG/1
1 TABLET ORAL DAILY
Qty: 90 TABLET | Refills: 0 | Status: SHIPPED | OUTPATIENT
Start: 2022-11-28

## 2023-01-16 ENCOUNTER — OFFICE VISIT (OUTPATIENT)
Dept: SLEEP CENTER | Facility: CLINIC | Age: 58
End: 2023-01-16

## 2023-01-16 VITALS
HEIGHT: 69 IN | DIASTOLIC BLOOD PRESSURE: 78 MMHG | BODY MASS INDEX: 43.84 KG/M2 | RESPIRATION RATE: 18 BRPM | HEART RATE: 68 BPM | OXYGEN SATURATION: 98 % | TEMPERATURE: 98 F | WEIGHT: 296 LBS | SYSTOLIC BLOOD PRESSURE: 134 MMHG

## 2023-01-16 DIAGNOSIS — G47.33 OSA (OBSTRUCTIVE SLEEP APNEA): Primary | ICD-10-CM

## 2023-01-16 NOTE — PROGRESS NOTES
Consultation - [address] [facility], 1965, MRN: [id number]    1/16/2023        Reason for Consult / Principal Problem:    History of Obstructive Sleep Apnea       Thank you for the opportunity of participating in the evaluation and care of this patient in the Sleep Clinic at [facility]  Subjective:     HPI: Tao Sanchez is a 59y o  year old male  He presents for a consultation regarding obstructive sleep apnea  He recently moved from [place] to [place] and would like to establish care  He was previously diagnosed with ELIZA and followed with a Sleep Medicine provider annually  He reports that he uses CPAP equipment nightly and feels he sleeps well when using it  Prior to use of CPAP equipment, he was waking up 4-5 times every 3 hours and was exhausted all day long  He was changing supplies regularly, but has not had a prescription in quite some time  Comorbid conditions:  Obesity  HTN  Type 2 diabetes  Review of Systems      Genitourinary none   Cardiology none   Gastrointestinal none   Neurology forgetfulness and difficulty with memory   Constitutional claustrophobia   Integumentary none   Psychiatry none   Musculoskeletal joint pain, back pain and sciatica   Pulmonary shortness of breath with activity   ENT none   Endocrine none   Hematological none       Pertinent symptoms:  Snoring, EDS, witnessed apnea without use of CPAP    Sleep Study Results:  Results of past sleep studies are not available at the time of this consultation  He plans to try to obtain them and have them forwarded to the Sleep Center  CPAP Equipment:  Equipment set up date:  Unsure of set up date - ResMed  PAP Pressure: Nasal AutoPAP using a lower limit of 5 cm and an upper limit of 15 cm of water pressure  Type of mask used: nasal pillow  DME Provider: Kristen Lamb    Employment:  He is currently retired    He previously worked for [employer], mainly day shift, from 6:00am-7:00pm     Sleep Schedule:       Bedtime:  11:30pm, watches TV in bed until he falls asleep      Latency:  Falls asleep while watching TV      Wakeup time:  8:30am    Awakenings:       Frequency:  Currently 1-2 times per night  (prior to use of CPAP, was waking 5-6 times in 3 hours)      Causes:  Dogs wake him to go out, once per night to urinate        Duration:  Returns to sleep within a few minutes, without difficulty    Daytime Sleepiness / Inappropriate Sleep:       Most severe:  He is not sleepy when using CPAP equipment  Prior to use, he struggled to stay awake  Naps :  He does not intentionally take naps      Inappropriate drowsiness / sleep:  He does not doze unintentionally when using CPAP equipment   (prior to use of CPAP, he fell asleep in waiting rooms and any time he was sedentary)    Snoring:  He snored loudly with gasping noted by his wife    Apnea:  He had witnessed apnea prior to use of CPAP equipment    Change in Weight:  He lost 60 lbs, but recently gained 10 lbs back since stopping Ozempic, which was stopped when he developed pancreatitis  Restless Leg Syndrome:  no clinical symptoms consistent with this diagnosis     Other Complaints:  He walked in his sleep when he was a child, but not in adulthood  No reports of sleep talking, sleep paralysis or hallucinations surrounding sleep  He does not wake up with headaches  He reports bruxism with no use of an oral appliance  Social History:      Caffeine:  16-24 ounces of coffee daily, occasional soda       Tobacco:   reports that he has been smoking cigars  He has never used smokeless tobacco      E-cig/Vaping:    E-Cigarette/Vaping   • E-Cigarette Use Never User       E-Cigarette/Vaping Substances         Alcohol:   reports that he does not currently use alcohol   1-2 beers per week or less      Drugs:   reports that he does not currently use drugs after having used the following drugs: Marijuana  The review of systems and following portions of the patient's history were reviewed and updated as appropriate: allergies, current medications, past family history, past medical history, past social history, past surgical history and problem list         Objective:       Vitals:    01/16/23 0717   BP: 134/78   Pulse: 68   Resp: 18   Temp: 98 °F (36 7 °C)   SpO2: 98%   Weight: 134 kg (296 lb)   Height: 5' 9" (1 753 m)     Body mass index is 43 71 kg/m²  Patriot Sleepiness Scale:  Total score: 4      Current Outpatient Medications:   •  amlodipine-olmesartan (MANAS) 5-20 MG, Take 1 tablet by mouth daily, Disp: 90 tablet, Rfl: 0  •  cyanocobalamin (VITAMIN B-12) 100 MCG tablet, Take by mouth, Disp: , Rfl:   •  levothyroxine 88 mcg tablet, Take 1 tablet (88 mcg total) by mouth daily, Disp: 90 tablet, Rfl: 1  •  Multiple Vitamin (Multi-Vitamin) tablet, Take 1 tablet by mouth daily, Disp: , Rfl:   •  rosuvastatin (CRESTOR) 40 MG tablet, Take 1 tablet (40 mg total) by mouth daily with dinner, Disp: 90 tablet, Rfl: 1  •  Cholecalciferol 50 MCG (2000 UT) CAPS, Take by mouth daily, Disp: , Rfl:   •  metFORMIN (GLUCOPHAGE) 1000 MG tablet, Take 1,000 mg by mouth 2 (two) times a day with meals, Disp: , Rfl:     Physical Exam  General Appearance:   Alert, cooperative, no distress, appears stated age, obese     Head:   Normocephalic, without obvious abnormality, atraumatic     Eyes:   PERRL, conjunctiva/corneas clear          Nose:  Nares normal, septum midline, mucosa normal, no drainage or sinus tenderness           Throat:  Lips, teeth and gums normal; tongue normal in size and midline in position; mucosa moist and redundant bilaterally, uvula thick, tonsils not visualized, Mallampati class 4       Neck:  Supple, symmetrical, trachea midline, no adenopathy; no thyromegaly noted, no carotid bruit or JVD     Lungs:      Clear to auscultation bilaterally, respirations unlabored     Heart:   Regular rate and rhythm, S1 and S2 normal, no murmur, rub or gallop       Extremities:  Extremities normal, atraumatic, no cyanosis or edema       Skin:  Skin color, texture, turgor normal, no rashes or lesions       Neurologic:  No focal deficits noted  ASSESSMENT / PLAN     1  ELIZA (obstructive sleep apnea)  Ambulatory Referral to Sleep Medicine    Home Study    PAP DME Resupply/Reorder    Resmed DME Pressure Change            Counseling / Coordination of Care  Total clinic time spent today 55 minutes  Greater than 50% of total time was spent with the patient and / or family counseling and / or coordination of care  A description of the counseling / coordination of care:     diagnostic results, instructions for management, risk factor reductions, prognosis, patient and family education, impressions, risks and benefits of treatment options and importance of compliance with treatment    Today I reviewed the patient's compliance data  He has been able to use the equipment 100% of all days recorded  Average usage was 4 or more hours 97% of all days recorded  The patient uses the equipment for an average of 6 hours and 43 minutes per night  The estimated AHI is 0 9 abnormal breathing events per hour  The patient feels he benefits from the use of the equipment and would like to continue PAP treatment  Response to treatment has been excellent  There are currently no results of past sleep studies  He will attempt to obtain them and forward to the Sleep Center  IF results are not available, a home sleep study will be done to re-confirm ELIZA  Once results are re-confirmed, a prescription for supplies can be provided to last for the next year  AHI indicates excellent treatment  A pressure change to increase the starting pressure has been ordered  This will need to be done manually with his equipment, unless the name of the previous DME company is available and they still manage his modem   We discussed that having the modem transferred to a more local company would be a better option  He will continue using this equipment at the settings noted above for the next 12 months  At that time he will return for a routine follow-up evaluation  I have asked the patient to contact the [facility] [address] if any difficulties are encountered prior to that time  The following instructions have been given to the patient today:    Patient Instructions   1  Schedule home sleep study  2  Try to have past sleep study forwarded and we may not need to complete the home study  3  Check MyAir periodically and review events  Call if consistently higher than 5   4  Schedule follow up visit in one year  5  Call if you have any questions or concerns prior to next visit, as needed  6   Schedule an appointment with [facility] when your wife has her appointment  Bring your Resmed CPAP with you and request that [facility] take over your modem from your prior company  Bring the company information  Nursing Support:  When: Monday through Friday 7A-5PM except holidays  Where: Our direct line is [phone]  If you are having a true emergency please call 911  In the event that the line is busy or it is after hours please leave a voice message and we will return your call  Please speak clearly, leaving your full name, birth date, best number to reach you and the reason for your call  Medication refills: We will need the name of the medication, the dosage, the ordering provider, whether you get a 30 or 90 day refill, and the pharmacy name and address  Medications will be ordered by the provider only  Nurses cannot call in prescriptions  Please allow 7 days for medication refills  Physician requested updates:  If your provider requested that you call with an update after starting medication, please be ready to provide us the medication and dosage, what time you take your medication, the time you attempt to fall asleep, time you fall asleep, when you wake up, and what time you get out of bed  Sleep Study Results: We will contact you with sleep study results and/or next steps after the physician has reviewed your testing  Lashanda Perez, [address] [place] [address]      Portions of the record may have been created with voice recognition software  Occasional wrong word or "sound a like" substitutions may have occurred due to the inherent limitations of voice recognition software  Read the chart carefully and recognize, using context, where substitutions have occurred

## 2023-01-16 NOTE — PATIENT INSTRUCTIONS
Schedule home sleep study  Try to have past sleep study forwarded and we may not need to complete the home study  Check MyAir periodically and review events  Call if consistently higher than 5  Schedule follow up visit in one year  Call if you have any questions or concerns prior to next visit, as needed  6   Schedule an appointment with [facility] when your wife has her appointment  Bring your Resmed CPAP with you and request that [facility] take over your modem from your prior company  Bring the company information  Nursing Support:  When: Monday through Friday 7A-5PM except holidays  Where: Our direct line is [phone]  If you are having a true emergency please call 911  In the event that the line is busy or it is after hours please leave a voice message and we will return your call  Please speak clearly, leaving your full name, birth date, best number to reach you and the reason for your call  Medication refills: We will need the name of the medication, the dosage, the ordering provider, whether you get a 30 or 90 day refill, and the pharmacy name and address  Medications will be ordered by the provider only  Nurses cannot call in prescriptions  Please allow 7 days for medication refills  Physician requested updates: If your provider requested that you call with an update after starting medication, please be ready to provide us the medication and dosage, what time you take your medication, the time you attempt to fall asleep, time you fall asleep, when you wake up, and what time you get out of bed  Sleep Study Results: We will contact you with sleep study results and/or next steps after the physician has reviewed your testing

## 2023-01-17 ENCOUNTER — TELEPHONE (OUTPATIENT)
Dept: SLEEP CENTER | Facility: CLINIC | Age: 58
End: 2023-01-17

## 2023-01-17 LAB
DME PARACHUTE DELIVERY DATE REQUESTED: NORMAL
DME PARACHUTE ITEM DESCRIPTION: NORMAL
DME PARACHUTE ORDER STATUS: NORMAL
DME PARACHUTE SUPPLIER NAME: NORMAL
DME PARACHUTE SUPPLIER PHONE: NORMAL

## 2023-01-17 NOTE — TELEPHONE ENCOUNTER
[facility] [place]

## 2023-01-18 LAB
DME PARACHUTE DELIVERY DATE ACTUAL: NORMAL
DME PARACHUTE DELIVERY DATE REQUESTED: NORMAL
DME PARACHUTE ITEM DESCRIPTION: NORMAL
DME PARACHUTE ORDER STATUS: NORMAL
DME PARACHUTE SUPPLIER NAME: NORMAL
DME PARACHUTE SUPPLIER PHONE: NORMAL

## 2023-01-19 ENCOUNTER — OFFICE VISIT (OUTPATIENT)
Dept: FAMILY MEDICINE CLINIC | Facility: CLINIC | Age: 58
End: 2023-01-19

## 2023-01-19 VITALS
RESPIRATION RATE: 18 BRPM | HEART RATE: 75 BPM | WEIGHT: 297.6 LBS | OXYGEN SATURATION: 96 % | BODY MASS INDEX: 43.95 KG/M2 | DIASTOLIC BLOOD PRESSURE: 52 MMHG | TEMPERATURE: 99.3 F | SYSTOLIC BLOOD PRESSURE: 111 MMHG

## 2023-01-19 DIAGNOSIS — R35.0 BENIGN PROSTATIC HYPERPLASIA WITH URINARY FREQUENCY: ICD-10-CM

## 2023-01-19 DIAGNOSIS — I10 PRIMARY HYPERTENSION: ICD-10-CM

## 2023-01-19 DIAGNOSIS — N40.1 BENIGN PROSTATIC HYPERPLASIA WITH URINARY FREQUENCY: ICD-10-CM

## 2023-01-19 DIAGNOSIS — E03.9 HYPOTHYROIDISM (ACQUIRED): ICD-10-CM

## 2023-01-19 DIAGNOSIS — E03.9 ACQUIRED HYPOTHYROIDISM: ICD-10-CM

## 2023-01-19 DIAGNOSIS — E11.42 TYPE 2 DIABETES MELLITUS WITH DIABETIC POLYNEUROPATHY, WITHOUT LONG-TERM CURRENT USE OF INSULIN (HCC): ICD-10-CM

## 2023-01-19 DIAGNOSIS — Z11.4 SCREENING FOR HIV (HUMAN IMMUNODEFICIENCY VIRUS): ICD-10-CM

## 2023-01-19 DIAGNOSIS — R41.3 SHORT-TERM MEMORY LOSS: Primary | ICD-10-CM

## 2023-01-19 DIAGNOSIS — R68.89 FORGETFULNESS: ICD-10-CM

## 2023-01-19 DIAGNOSIS — E78.2 MIXED HYPERLIPIDEMIA: ICD-10-CM

## 2023-01-19 DIAGNOSIS — R79.89 ELEVATED LFTS: ICD-10-CM

## 2023-01-19 RX ORDER — AMLODIPINE AND OLMESARTAN MEDOXOMIL 5; 20 MG/1; MG/1
1 TABLET ORAL DAILY
Qty: 90 TABLET | Refills: 1 | Status: SHIPPED | OUTPATIENT
Start: 2023-01-19

## 2023-01-19 RX ORDER — LEVOTHYROXINE SODIUM 88 UG/1
88 TABLET ORAL DAILY
Qty: 90 TABLET | Refills: 1 | Status: SHIPPED | OUTPATIENT
Start: 2023-01-19

## 2023-01-19 RX ORDER — TAMSULOSIN HYDROCHLORIDE 0.4 MG/1
0.4 CAPSULE ORAL
Qty: 90 CAPSULE | Refills: 1 | Status: SHIPPED | OUTPATIENT
Start: 2023-01-19

## 2023-01-19 RX ORDER — CALCIUM CARBONATE 260MG(650)
TABLET,CHEWABLE ORAL
COMMUNITY

## 2023-01-19 NOTE — PROGRESS NOTES
[address]    NAME: Nico Costello  AGE: 59 y o  SEX: male  : 1965     DATE: 2023     Assessment and Plan:     1  Type 2 diabetes mellitus with diabetic polyneuropathy, without long-term current use of insulin ([facility] [address] )  - he is OFF Ozempic due to Pancreatitis  We will also stop his metformin - he does not want to be on this  Feels ok off these and BG has been controlled  He has gained weight, Ozempic was very helpful for him in this regard  Zainab Chávez is utd  On ARB and Statin therapy  Had Diab eye Exam   We just do not have record  Lab Results   Component Value Date    HGBA1C 5 9 2022   - Comprehensive metabolic panel; Future  - HEMOGLOBIN A1C W/ EAG ESTIMATION; Future    2  Screening for HIV (human immunodeficiency virus)  Declined  3  Short-term memory loss  Will send to Neurology  We did do a visit for cognitive screen and MOCA was   his wife tells me he cont to have issues with short term memory, gets lost in conversations, forgets what he is saying, etc   Lab w/u has been OK  We did not do imaging  Will send to neurology for assistance at this point  They are on board  5  Mixed hyperlipidemia  - on statin  Discussed the importance of maintaining a healthy lifestyle through heart healthy diet and exercise  6  Benign prostatic hyperplasia with urinary frequency  - symptoms worsening  PSA on file  He is with frequency, does not feel he completely empties, etc   Will trial flomax  For persistence, he can let me know, can send to urology  - tamsulosin (FLOMAX) 0 4 mg; Take 1 capsule (0 4 mg total) by mouth daily with dinner  Dispense: 90 capsule; Refill: 1    7  Acquired hypothyroidism  - stable  - TSH, 3rd generation with Free T4 reflex; Future    8  Primary hypertension  Stable  Discussed the importance of maintaining a healthy lifestyle through heart healthy diet and exercise      - amlodipine-olmesartan (MANAS) 5-20 MG; Take 1 tablet by mouth daily  Dispense: 90 tablet; Refill: 1    9  Hypothyroidism (acquired)  - levothyroxine 88 mcg tablet; Take 1 tablet (88 mcg total) by mouth daily  Dispense: 90 tablet; Refill: 1    Return in about 3 months (around 4/19/2023) for f/u CDM   He is asking if he can come off of his statin and BP meds  I did not encourage that he do this as he is well managed but if he so chooses, he can see how it goes  Elev LFTs - we will repeat  If elev, will need to get an US/pursue further w/u  Return in about 3 months (around 4/19/2023) for f/u CDM   Patient/Caretaker verbalized understanding and were in agreement with today's assessment and plan  Time was taken to address any questions patient/caretaker had  Indication/Risks/Benefits of medication(s) as prescribed were discussed with the patient/caretaker  The patient verbalized understanding and agreement and elects to take medications as prescribed  Time was taken to answer any questions the patient/caretaker may have had  BMI Counseling: There is no height or weight on file to calculate BMI  The BMI is above normal  Nutrition recommendations include decreasing portion sizes, encouraging healthy choices of fruits and vegetables, decreasing fast food intake, consuming healthier snacks, limiting drinks that contain sugar, reducing intake of saturated and trans fat and reducing intake of cholesterol  Exercise recommendations include exercising 3-5 times per week and strength training exercises  Rationale for BMI follow-up plan is due to patient being overweight or obese  Tobacco Cessation Counseling: Tobacco cessation counseling was provided   The patient is sincerely urged to quit consumption of tobacco  He is not ready to quit tobacco  He is smoking cigars and MINIMALLY          Chief Complaint:     Chief Complaint   Patient presents with   • Follow-up      History of Present Illness: The patient reports:      Was having nausea  We stopped Ozempic and ultimately metformin at last visit  He has no abdominal pain, no longer losing weight  His appetite is much better since being off this medicine  He is gaining weight  His BG has been controlled  Type 2 diabetes mellitus with diabetic polyneuropathy, without long-term current use of insulin (HCC)  - A1C has been well controlled  Now diet controlled  Will send for labs  - eye exam/foot exam is utd   Is taking statin and ARB therapy  ELIZA (obstructive sleep apnea)  - has seen sleep clinic  Mixed hyperlipidemia  - on statin - he is tolerating this  He is watching his diet for the most part  He is not intentionally exercising  He is doing his ADLs  He has a bad back and has to rest with this  Primary hypertension  - stable on regimen  No cp, sob, edema      H/o Renal stones - passed one recently without issues  Tobacco -- occasional cigars -- down from 4/day  Alcohol --  none -- did used to drink excessively  He would drink up to 30/day  Stopped this about 10 years ago  Did have elev LFTs with prior PCP and w/u was underway and they moved  Chronic bilateral low back pain without sciatica  - stable  He is not taking anything for his pain  Wife uses medical [facility], but he does not  Hypothyroidism (acquired)  - stable  B12 deficiency  - taking supplement  Vitamin B12; Future     Urinary hesitancy/frquency  PSA checked  OK  Used to take oxybutynin but no longer does - this was to help avoid him going too often  Was much more helpful when he was   He is retired  He feels he needs improvement in emptying, etc   Has not seen urology  Fatty Liver Dz -- elev LFTs  - tells me where he came from, they wanted to do a further w/u for this, he did not end up pursuing this  History of excess alcohol but he rarely drinks    Has not had excess drinks in about 10 years   Used to drink 30/day  Diet and Physical Activity  · Diet/Nutrition: needs to work on this  · Exercise: no formal exercise  Depression Screening  PHQ-2/9 Depression Screening    Little interest or pleasure in doing things: 0 - not at all  Feeling down, depressed, or hopeless: 0 - not at all             Review of Systems:     Review of Systems   All other systems reviewed and are negative  Past Medical History:     Past Medical History:   Diagnosis Date   • Chronic bilateral low back pain without sciatica    • Cigar smoker    • Fatty liver    • Hypertension    • Memory change    • Mixed hyperlipidemia    • Non-insulin dependent type 2 diabetes mellitus ([facility] [address] )    • Obstructive sleep apnea       Past Surgical History:     History reviewed  No pertinent surgical history  Family History:     Family History   Problem Relation Age of Onset   • Stroke Mother    • Heart disease Father       Social History:     Social History     Socioeconomic History   • Marital status: /Civil Union     Spouse name: None   • Number of children: None   • Years of education: None   • Highest education level: None   Occupational History   • None   Tobacco Use   • Smoking status: Some Days     Types: Cigars   • Smokeless tobacco: Never   Vaping Use   • Vaping Use: Never used   Substance and Sexual Activity   • Alcohol use: Not Currently   • Drug use: Never   • Sexual activity: Yes     Partners: Female     Comment:    Other Topics Concern   • None   Social History Narrative   • None     Social Determinants of Health     Financial Resource Strain: Low Risk    • Difficulty of Paying Living Expenses: Not hard at all   Food Insecurity: No Food Insecurity   • Worried About Running Out of Food in the Last Year: Never true   • Ran Out of Food in the Last Year: Never true   Transportation Needs: No Transportation Needs   • Lack of Transportation (Medical): No   • Lack of Transportation (Non-Medical):  No   Physical Activity: Inactive   • Days of Exercise per Week: 5 days   • Minutes of Exercise per Session: 0 min   Stress: No Stress Concern Present   • Feeling of Stress : Not at all   Social Connections: Moderately Isolated   • Frequency of Communication with Friends and Family: More than three times a week   • Frequency of Social Gatherings with Friends and Family: Once a week   • Attends Mu-ism Services: Never   • Active Member of Clubs or Organizations: No   • Attends Club or Organization Meetings: Never   • Marital Status:    Intimate Partner Violence: Not At Risk   • Fear of Current or Ex-Partner: No   • Emotionally Abused: No   • Physically Abused: No   • Sexually Abused: No   Housing Stability: Low Risk    • Unable to Pay for Housing in the Last Year: No   • Number of [place] in the Last Year: 1   • Unstable Housing in the Last Year: No      Current Medications:     Current Outpatient Medications   Medication Sig Dispense Refill   • amlodipine-olmesartan (MANAS) 5-20 MG Take 1 tablet by mouth daily 90 tablet 1   • Cholecalciferol 50 MCG (2000 UT) CAPS Take by mouth daily     • cyanocobalamin (VITAMIN B-12) 100 MCG tablet Take by mouth     • levothyroxine 88 mcg tablet Take 1 tablet (88 mcg total) by mouth daily 90 tablet 1   • Multiple Vitamin (Multi-Vitamin) tablet Take 1 tablet by mouth daily     • rosuvastatin (CRESTOR) 40 MG tablet Take 1 tablet (40 mg total) by mouth daily with dinner 90 tablet 1   • tamsulosin (FLOMAX) 0 4 mg Take 1 capsule (0 4 mg total) by mouth daily with dinner 90 capsule 1   • Zinc 10 MG LOZG Apply to the mouth or throat       No current facility-administered medications for this visit  Allergies:      Allergies   Allergen Reactions   • Codeine Other (See Comments)     Reaction Date: 10Dec2007;    • Morphine And Related GI Intolerance     nauseated  Other reaction(s): GI Intolerance  Other reaction(s): GI Intolerance        Physical Exam:     /52 (BP Location: Right arm, Patient Position: Sitting, Cuff Size: Large)   Pulse 75   Temp 99 3 °F (37 4 °C) (Tympanic)   Resp 18   Wt 135 kg (297 lb 9 6 oz)   SpO2 96%   BMI 43 95 kg/m²     Physical Exam  Vitals and nursing note reviewed  Constitutional:       Appearance: Normal appearance  He is obese  HENT:      Head: Normocephalic and atraumatic  Right Ear: Tympanic membrane and ear canal normal       Left Ear: Tympanic membrane and ear canal normal       Nose: Nose normal       Mouth/Throat:      Mouth: Mucous membranes are moist       Pharynx: Oropharynx is clear  Eyes:      Conjunctiva/sclera: Conjunctivae normal       Pupils: Pupils are equal, round, and reactive to light  Cardiovascular:      Rate and Rhythm: Normal rate and regular rhythm  Pulses: no weak pulses          Dorsalis pedis pulses are 2+ on the right side and 2+ on the left side  Pulmonary:      Effort: Pulmonary effort is normal       Breath sounds: Normal breath sounds  No wheezing, rhonchi or rales  Abdominal:      General: Bowel sounds are normal       Palpations: Abdomen is soft  Musculoskeletal:      Cervical back: Neck supple  Feet:      Right foot:      Skin integrity: No ulcer, skin breakdown, erythema, warmth, callus or dry skin  Left foot:      Skin integrity: Callus (L great toe) present  No ulcer, skin breakdown, erythema, warmth or dry skin  Lymphadenopathy:      Cervical: No cervical adenopathy  Skin:     General: Skin is warm and dry  Neurological:      General: No focal deficit present  Mental Status: He is alert and oriented to person, place, and time  Psychiatric:         Mood and Affect: Mood normal          Behavior: Behavior normal          Thought Content:  Thought content normal          Judgment: Judgment normal           DO Jimmy Lackey [facility]

## 2023-01-23 NOTE — TELEPHONE ENCOUNTER
Patient called and left vm  He states he has an old sleep study that he can send over to us if we call him back    #: [phone]    Patient does have a referral to Neurology but no appointment scheduled yet  I was not sure if you guys were potentially waiting for a previous sleep study from patient?

## 2023-01-24 NOTE — TELEPHONE ENCOUNTER
Left message for the patient that he can bring his old sleep study to his next appointment to be scanned to his chart

## 2023-02-09 LAB
ALBUMIN SERPL-MCNC: 4.5 G/DL (ref 3.6–5.1)
ALBUMIN/GLOB SERPL: 1.5 (CALC) (ref 1–2.5)
ALP SERPL-CCNC: 100 U/L (ref 35–144)
ALT SERPL-CCNC: 37 U/L (ref 9–46)
AST SERPL-CCNC: 36 U/L (ref 10–35)
BILIRUB SERPL-MCNC: 0.5 MG/DL (ref 0.2–1.2)
BUN SERPL-MCNC: 15 MG/DL (ref 7–25)
BUN/CREAT SERPL: 24 (CALC) (ref 6–22)
CALCIUM SERPL-MCNC: 9.5 MG/DL (ref 8.6–10.3)
CHLORIDE SERPL-SCNC: 105 MMOL/L (ref 98–110)
CO2 SERPL-SCNC: 25 MMOL/L (ref 20–32)
CREAT SERPL-MCNC: 0.63 MG/DL (ref 0.7–1.3)
EST. AVERAGE GLUCOSE BLD GHB EST-MCNC: 134 MG/DL
EST. AVERAGE GLUCOSE BLD GHB EST-SCNC: 7.4 MMOL/L
GFR/BSA.PRED SERPLBLD CYS-BASED-ARV: 110 ML/MIN/1.73M2
GLOBULIN SER CALC-MCNC: 3 G/DL (CALC) (ref 1.9–3.7)
GLUCOSE SERPL-MCNC: 108 MG/DL (ref 65–99)
HBA1C MFR BLD: 6.3 % OF TOTAL HGB
POTASSIUM SERPL-SCNC: 4.7 MMOL/L (ref 3.5–5.3)
PROT SERPL-MCNC: 7.5 G/DL (ref 6.1–8.1)
SODIUM SERPL-SCNC: 139 MMOL/L (ref 135–146)
TSH SERPL-ACNC: 2.07 MIU/L (ref 0.4–4.5)

## 2023-03-19 DIAGNOSIS — I10 PRIMARY HYPERTENSION: ICD-10-CM

## 2023-03-20 RX ORDER — AMLODIPINE AND OLMESARTAN MEDOXOMIL 5; 20 MG/1; MG/1
1 TABLET ORAL DAILY
Qty: 90 TABLET | Refills: 0 | Status: SHIPPED | OUTPATIENT
Start: 2023-03-20

## 2023-04-20 DIAGNOSIS — E11.42 TYPE 2 DIABETES MELLITUS WITH DIABETIC POLYNEUROPATHY, WITHOUT LONG-TERM CURRENT USE OF INSULIN (HCC): ICD-10-CM

## 2023-04-20 PROBLEM — E08.00 DIABETES MELLITUS DUE TO UNDERLYING CONDITION WITH HYPEROSMOLARITY WITHOUT COMA, WITHOUT LONG-TERM CURRENT USE OF INSULIN (HCC): Status: ACTIVE | Noted: 2023-04-20

## 2023-04-20 LAB
CREAT UR-MCNC: 40.4 MG/DL
MICROALBUMIN UR-MCNC: 8.4 MG/L (ref 0–20)
MICROALBUMIN/CREAT 24H UR: 21 MG/G CREATININE (ref 0–30)

## 2023-04-21 ENCOUNTER — TELEPHONE (OUTPATIENT)
Dept: ADMINISTRATIVE | Facility: OTHER | Age: 58
End: 2023-04-21

## 2023-04-21 NOTE — LETTER
Diabetic Eye Exam Form    Date Requested: 23  Patient: Verona Cervantes  Patient : 1965   Referring Provider: Cachorro Avila DO      DIABETIC Eye Exam Date _______________________________      Type of Exam MUST be documented for Diabetic Eye Exams  Please CHECK ONE  Retinal Exam       Dilated Retinal Exam       OCT       Optomap-Iris Exam      Fundus Photography       Left Eye - Please check Retinopathy or No Retinopathy        Exam did show retinopathy    Exam did not show retinopathy       Right Eye - Please check Retinopathy or No Retinopathy       Exam did show retinopathy    Exam did not show retinopathy       Comments __________________________________________________________    Practice Providing Exam ______________________________________________    Exam Performed By (print name) _______________________________________      Provider Signature ___________________________________________________      These reports are needed for  compliance  Please fax this completed form and a copy of the Diabetic Eye Exam report to our office located at [place] [address] as soon as possible via Fax [phone] virgil Lara Drain: Phone [phone]  We thank you for your assistance in treating our mutual patient

## 2023-04-21 NOTE — TELEPHONE ENCOUNTER
Upon review of the In Basket request and the patient's chart, initial outreach has been made via fax to facility  Please see Contacts section for details       Thank you  Lord Flaherty

## 2023-04-21 NOTE — TELEPHONE ENCOUNTER
----- Message from Tiarra Bernardo sent at 4/21/2023  8:35 AM EDT -----  Regarding: Care Gap Request  04/21/23 8:35 AM    Hello, our patient attached above has had Diabetic Eye Exam completed/performed  Please assist in updating the patient chart by making an External outreach to facility located in [place], [place]  The date of service is unknown      Thank you,  Tiarra [facility]

## 2023-04-24 NOTE — TELEPHONE ENCOUNTER
As a follow-up, a second attempt has been made for outreach via fax to facility  Please see Contacts section for details      Thank you  Deirdre Sullivan

## 2023-05-01 NOTE — TELEPHONE ENCOUNTER
As a final attempt, a third outreach has been made via telephone call to facility  Please see Contacts section for details  This encounter will be closed and completed by end of day  Should we receive the requested information because of previous outreach attempts, the requested patient's chart will be updated appropriately       Thank you  Lashanda Lucio

## 2023-05-01 NOTE — TELEPHONE ENCOUNTER
Upon review of the In Basket request we were able to locate, review, and update the patient chart as requested for Diabetic Eye Exam     Any additional questions or concerns should be emailed to the Practice Liaisons via the appropriate education email address, please do not reply via In Basket      Thank you  Lord Flaherty

## 2023-05-10 LAB
EST. AVERAGE GLUCOSE BLD GHB EST-MCNC: 126 MG/DL
EST. AVERAGE GLUCOSE BLD GHB EST-SCNC: 7 MMOL/L
HBA1C MFR BLD: 6 % OF TOTAL HGB

## 2023-06-18 DIAGNOSIS — E03.9 HYPOTHYROIDISM (ACQUIRED): ICD-10-CM

## 2023-06-18 DIAGNOSIS — I10 PRIMARY HYPERTENSION: ICD-10-CM

## 2023-06-18 DIAGNOSIS — E78.2 MIXED HYPERLIPIDEMIA: ICD-10-CM

## 2023-06-19 RX ORDER — LEVOTHYROXINE SODIUM 88 UG/1
88 TABLET ORAL DAILY
Qty: 90 TABLET | Refills: 0 | Status: SHIPPED | OUTPATIENT
Start: 2023-06-19

## 2023-06-19 RX ORDER — ROSUVASTATIN CALCIUM 40 MG/1
40 TABLET, COATED ORAL
Qty: 90 TABLET | Refills: 0 | Status: SHIPPED | OUTPATIENT
Start: 2023-06-19

## 2023-06-19 RX ORDER — AMLODIPINE AND OLMESARTAN MEDOXOMIL 5; 20 MG/1; MG/1
1 TABLET ORAL DAILY
Qty: 90 TABLET | Refills: 0 | Status: SHIPPED | OUTPATIENT
Start: 2023-06-19

## 2023-08-15 ENCOUNTER — OFFICE VISIT (OUTPATIENT)
Dept: FAMILY MEDICINE CLINIC | Facility: CLINIC | Age: 58
End: 2023-08-15
Payer: COMMERCIAL

## 2023-08-15 VITALS
HEART RATE: 63 BPM | WEIGHT: 291.4 LBS | OXYGEN SATURATION: 95 % | RESPIRATION RATE: 18 BRPM | BODY MASS INDEX: 43.03 KG/M2 | DIASTOLIC BLOOD PRESSURE: 80 MMHG | TEMPERATURE: 99 F | SYSTOLIC BLOOD PRESSURE: 122 MMHG

## 2023-08-15 DIAGNOSIS — E08.00 DIABETES MELLITUS DUE TO UNDERLYING CONDITION WITH HYPEROSMOLARITY WITHOUT COMA, WITHOUT LONG-TERM CURRENT USE OF INSULIN (HCC): ICD-10-CM

## 2023-08-15 DIAGNOSIS — R09.89 THROAT CLEARING: ICD-10-CM

## 2023-08-15 DIAGNOSIS — Z23 ENCOUNTER FOR IMMUNIZATION: ICD-10-CM

## 2023-08-15 DIAGNOSIS — Z11.4 SCREENING FOR HIV (HUMAN IMMUNODEFICIENCY VIRUS): ICD-10-CM

## 2023-08-15 DIAGNOSIS — Z23 ENCOUNTER FOR VACCINATION: ICD-10-CM

## 2023-08-15 DIAGNOSIS — I10 PRIMARY HYPERTENSION: Primary | ICD-10-CM

## 2023-08-15 DIAGNOSIS — E78.2 MIXED HYPERLIPIDEMIA: ICD-10-CM

## 2023-08-15 DIAGNOSIS — Z28.21 PNEUMOCOCCAL VACCINATION DECLINED: ICD-10-CM

## 2023-08-15 DIAGNOSIS — Z12.5 SCREENING FOR PROSTATE CANCER: ICD-10-CM

## 2023-08-15 DIAGNOSIS — E03.9 HYPOTHYROIDISM (ACQUIRED): ICD-10-CM

## 2023-08-15 LAB — SL AMB POCT HEMOGLOBIN AIC: 6.2 (ref ?–6.5)

## 2023-08-15 PROCEDURE — 90471 IMMUNIZATION ADMIN: CPT

## 2023-08-15 PROCEDURE — 99214 OFFICE O/P EST MOD 30 MIN: CPT | Performed by: FAMILY MEDICINE

## 2023-08-15 PROCEDURE — 90750 HZV VACC RECOMBINANT IM: CPT

## 2023-08-15 PROCEDURE — 83036 HEMOGLOBIN GLYCOSYLATED A1C: CPT | Performed by: FAMILY MEDICINE

## 2023-08-15 RX ORDER — ROSUVASTATIN CALCIUM 40 MG/1
40 TABLET, COATED ORAL
Qty: 90 TABLET | Refills: 1 | Status: SHIPPED | OUTPATIENT
Start: 2023-08-15

## 2023-08-15 RX ORDER — LEVOTHYROXINE SODIUM 88 UG/1
88 TABLET ORAL DAILY
Qty: 90 TABLET | Refills: 1 | Status: SHIPPED | OUTPATIENT
Start: 2023-08-15

## 2023-08-15 RX ORDER — AMLODIPINE AND OLMESARTAN MEDOXOMIL 5; 20 MG/1; MG/1
1 TABLET ORAL DAILY
Qty: 90 TABLET | Refills: 1 | Status: SHIPPED | OUTPATIENT
Start: 2023-08-15

## 2023-08-15 NOTE — PROGRESS NOTES
[facility]    NAME: Lester Velásquez  AGE: 59 y.o. SEX: male  : 1965     DATE: 2023     Assessment and Plan:     1. Type 2 diabetes mellitus with diabetic polyneuropathy, without long-term current use of insulin ([address])  - now diet controlled. Was on Ozempic - he is off 2/2 Pancreatitis. Was also on metformin. He is down weight from prior visit but up, overall since coming off Ozempic. ACR utd. He is on ARB therapy. He is on Statin therapy. Had Diab eye Exam.        Lab Results   Component Value Date    HGBA1C 6.2 08/15/2023     Short-term memory loss  Did neurology referral.  He declined this today. We did do a visit for cognitive screen and MOCA was . his wife tells me he cont to have issues with short term memory, gets lost in conversations, forgets what he is saying, etc.  Lab w/u has been OK. We did not do imaging. No acute worsening. Mixed hyperlipidemia  - on statin. Discussed the importance of maintaining a healthy lifestyle through heart healthy diet and exercise. Benign prostatic hyperplasia with urinary frequency  - symptoms worsening. PSA done over one year ago. Recheck ordered today. He is with frequency, does not feel he completely empties, etc.  flomax trialed - he did not fill - due to concern for SEs. Urology referral done -- did not go. Also has h/o renal stones and L Flank pain, intermittently. Should see Urology. Reiterated this to him today. Acquired hypothyroidism  - stable. Primary hypertension  Stable. Discussed the importance of maintaining a healthy lifestyle through heart healthy diet and exercise. Vitamin D deficiency  - cont supplement. OTC    Encounter for immunization  - decline pneumococcal  - agrees to 2/2 shingrix. He is unsure where he had the first -- this is on file.       Screening for prostate cancer/BPH   The natural history of prostate cancer and ongoing controversy regarding screening and potential treatment outcomes of prostate cancer has been discussed with the patient. The meaning of a false positive PSA and a false negative PSA has been discussed. He indicates understanding of the limitations of this screening test and wishes to proceed with screening PSA testing.  - PSA, total and free; Future    Pneumococcal vaccination declined    Mixed hyperlipidemia  - on statin. Tolerating. LDL at goal.  Will recheck as he is having other labs. Discussed the importance of maintaining a healthy lifestyle through heart healthy diet and exercise. - rosuvastatin (CRESTOR) 40 MG tablet; Take 1 tablet (40 mg total) by mouth daily with dinner  Dispense: 90 tablet; Refill: 1    Throat clearing  - will trial zyrtec daily. Discussed this could be pnd/GERD/SE to his ARB. This is pretty acute in nature. He agrees to trial daily antihistamine and let me know how he is doing. Return in about 3 months (around 11/15/2023) for Annual physical.  Labs ordered. Will discuss results/dispo when read  He cont to smoke cigars -- down to 2/week from 4/day  He is no longer drinking alcohol. Praised for ongoing cessation. Used to drink in Excess. Patient/Caretaker verbalized understanding and were in agreement with today's assessment and plan. Time was taken to address any questions patient/caretaker had. Indication/Risks/Benefits of medication(s) as prescribed were discussed with the patient/caretaker. The patient verbalized understanding and agreement and elects to take medications as prescribed. Time was taken to answer any questions the patient/caretaker may have had. BMI Counseling: There is no height or weight on file to calculate BMI.  The BMI is above normal. Nutrition recommendations include decreasing portion sizes, encouraging healthy choices of fruits and vegetables, decreasing fast food intake, consuming healthier snacks, limiting drinks that contain sugar, reducing intake of saturated and trans fat and reducing intake of cholesterol. Exercise recommendations include exercising 3-5 times per week and strength training exercises. Rationale for BMI follow-up plan is due to patient being overweight or obese. Tobacco Cessation Counseling: Tobacco cessation counseling was provided. The patient is sincerely urged to quit consumption of tobacco. He is not ready to quit tobacco. He is smoking cigars and MINIMALLY          Chief Complaint:     Chief Complaint   Patient presents with   • Follow-up      History of Present Illness: The patient reports:      Neurology referral placed in January - he forgot we placed this. Does not want to see them at the moment. This was for memory loss. Type 2 diabetes mellitus with diabetic polyneuropathy, without long-term current use of insulin ([facility])  - A1C has been well controlled. Now diet controlled. Lab Results   Component Value Date    HGBA1C 6.2 08/15/2023   - eye exam/foot exam is utd. Is taking statin and ARB therapy - tolerating but is c/o throat clearing over the past couple of months. ELIZA (obstructive sleep apnea)  - has seen sleep clinic. Mixed hyperlipidemia  - on statin - he is tolerating this. He is watching his diet for the most part. He is not intentionally exercising. He is doing his ADLs. Primary hypertension  - stable on regimen. No cp, sob, edema      H/o Renal stones - has been having some L flank pain. Urology referral done at prior visit and has not acted on this. He understands he needs to get est.  He is also with BPH. Tobacco -- occasional cigars -- down from 4/day --> 2/week. Alcohol --  none -- did used to drink excessively. Stopped this about 10 years ago. Did have elev LFTs with prior PCP and w/u was underway and they moved. Have been stable, here. Chronic bilateral low back pain without sciatica  - stable.   He is not taking anything for his pain. Wife uses medical [facility], but he does not. Hypothyroidism (acquired)  - stable. B12 deficiency  - taking supplement. OTC     Urinary hesitancy/frquency. PSA checked over one year ago. Agrees to recheck today. Used to take oxybutynin but no longer does - this was to help avoid him going too often. Was much more helpful when he was . He is retired. He feels he needs improvement in emptying. Trialed Flomax but was concerned about potential SE, therefore did not take. Did not f/u with Urology. Diet and Physical Activity  · Diet/Nutrition: improved. · Exercise: no formal exercise. Depression Screening  PHQ-2/9 Depression Screening    Little interest or pleasure in doing things: 0 - not at all  Feeling down, depressed, or hopeless: 0 - not at all             Review of Systems:     Review of Systems   All other systems reviewed and are negative. Past Medical History:     Past Medical History:   Diagnosis Date   • Chronic bilateral low back pain without sciatica    • Cigar smoker    • Fatty liver    • Hypertension    • Memory change    • Mixed hyperlipidemia    • Non-insulin dependent type 2 diabetes mellitus ([address])    • Obstructive sleep apnea       Past Surgical History:     History reviewed. No pertinent surgical history.    Family History:     Family History   Problem Relation Age of Onset   • Stroke Mother    • Heart disease Father       Social History:     Social History     Socioeconomic History   • Marital status: /Civil Union     Spouse name: None   • Number of children: None   • Years of education: None   • Highest education level: None   Occupational History   • None   Tobacco Use   • Smoking status: Some Days     Types: Cigars   • Smokeless tobacco: Never   Vaping Use   • Vaping Use: Never used   Substance and Sexual Activity   • Alcohol use: Not Currently   • Drug use: Never   • Sexual activity: Yes     Partners: Female     Comment:  Other Topics Concern   • None   Social History Narrative   • None     Social Determinants of Health     Financial Resource Strain: Low Risk  (9/30/2022)    Overall Financial Resource Strain (CARDIA)    • Difficulty of Paying Living Expenses: Not hard at all   Food Insecurity: No Food Insecurity (9/30/2022)    Hunger Vital Sign    • Worried About Running Out of Food in the Last Year: Never true    • Ran Out of Food in the Last Year: Never true   Transportation Needs: No Transportation Needs (9/30/2022)    PRAPARE - Transportation    • Lack of Transportation (Medical): No    • Lack of Transportation (Non-Medical): No   Physical Activity: Inactive (9/30/2022)    Exercise Vital Sign    • Days of Exercise per Week: 5 days    • Minutes of Exercise per Session: 0 min   Stress: No Stress Concern Present (9/30/2022)    [facility]    • Feeling of Stress : Not at all   Social Connections:  Moderately Isolated (9/30/2022)    Social Connection and Isolation Panel [NHANES]    • Frequency of Communication with Friends and Family: More than three times a week    • Frequency of Social Gatherings with Friends and Family: Once a week    • Attends Mu-ism Services: Never    • Active Member of Clubs or Organizations: No    • Attends Club or Organization Meetings: Never    • Marital Status:    Intimate Partner Violence: Not At Risk (9/30/2022)    Humiliation, Afraid, Rape, and Kick questionnaire    • Fear of Current or Ex-Partner: No    • Emotionally Abused: No    • Physically Abused: No    • Sexually Abused: No   Housing Stability: Low Risk  (9/30/2022)    Housing Stability Vital Sign    • Unable to Pay for Housing in the Last Year: No    • Number of [address] in the Last Year: 1    • Unstable Housing in the Last Year: No      Current Medications:     Current Outpatient Medications   Medication Sig Dispense Refill   • amlodipine-olmesartan (MANAS) 5-20 MG Take 1 tablet by mouth daily 90 tablet 1   • Cholecalciferol 50 MCG (2000 UT) CAPS Take 1 capsule (2,000 Units total) by mouth daily 90 capsule 3   • cyanocobalamin (VITAMIN B-12) 100 MCG tablet Take by mouth     • levothyroxine 88 mcg tablet Take 1 tablet (88 mcg total) by mouth daily 90 tablet 1   • Multiple Vitamin (Multi-Vitamin) tablet Take 1 tablet by mouth daily     • rosuvastatin (CRESTOR) 40 MG tablet Take 1 tablet (40 mg total) by mouth daily with dinner 90 tablet 1   • Zinc 10 MG LOZG Apply to the mouth or throat       No current facility-administered medications for this visit. Allergies: Allergies   Allergen Reactions   • Flomax [Tamsulosin] Other (See Comments)     Ejaculatory issue    • Codeine Other (See Comments)     Reaction Date: 10Dec2007;    • Morphine And Related GI Intolerance     nauseated  Other reaction(s): GI Intolerance  Other reaction(s): GI Intolerance        Physical Exam:     /80   Pulse 63   Temp 99 °F (37.2 °C) (Temporal)   Resp 18   Wt 132 kg (291 lb 6.4 oz)   SpO2 95%   BMI 43.03 kg/m²     Physical Exam  Vitals and nursing note reviewed. Constitutional:       Appearance: Normal appearance. He is obese. HENT:      Head: Normocephalic and atraumatic. Right Ear: Tympanic membrane and ear canal normal.      Left Ear: Tympanic membrane and ear canal normal.      Nose: Nose normal.      Mouth/Throat:      Mouth: Mucous membranes are moist.      Pharynx: Oropharynx is clear. Eyes:      Conjunctiva/sclera: Conjunctivae normal.      Pupils: Pupils are equal, round, and reactive to light. Cardiovascular:      Rate and Rhythm: Normal rate and regular rhythm. Pulses:           Dorsalis pedis pulses are 2+ on the right side and 2+ on the left side. Pulmonary:      Effort: Pulmonary effort is normal.      Breath sounds: Normal breath sounds. No wheezing, rhonchi or rales. Abdominal:      General: Bowel sounds are normal.      Palpations: Abdomen is soft. Musculoskeletal:      Cervical back: Neck supple. Feet:      Right foot:      Skin integrity: No ulcer, skin breakdown, erythema, warmth, callus or dry skin. Left foot:      Skin integrity: Callus (L great toe) present. No ulcer, skin breakdown, erythema, warmth or dry skin. Lymphadenopathy:      Cervical: No cervical adenopathy. Skin:     General: Skin is warm and dry. Neurological:      General: No focal deficit present. Mental Status: He is alert and oriented to person, place, and time. Psychiatric:         Mood and Affect: Mood normal.         Behavior: Behavior normal.         Thought Content:  Thought content normal.         Judgment: Judgment normal.          Shanae Larios, DO  [facility]

## 2023-08-17 ENCOUNTER — TELEPHONE (OUTPATIENT)
Dept: FAMILY MEDICINE CLINIC | Facility: CLINIC | Age: 58
End: 2023-08-17

## 2023-08-17 LAB
ALBUMIN SERPL-MCNC: 4.3 G/DL (ref 3.6–5.1)
ALBUMIN/GLOB SERPL: 1.7 (CALC) (ref 1–2.5)
ALP SERPL-CCNC: 110 U/L (ref 35–144)
ALT SERPL-CCNC: 31 U/L (ref 9–46)
AST SERPL-CCNC: 26 U/L (ref 10–35)
BASOPHILS # BLD AUTO: 38 CELLS/UL (ref 0–200)
BASOPHILS NFR BLD AUTO: 0.4 %
BILIRUB SERPL-MCNC: 0.6 MG/DL (ref 0.2–1.2)
BUN SERPL-MCNC: 15 MG/DL (ref 7–25)
BUN/CREAT SERPL: ABNORMAL (CALC) (ref 6–22)
CALCIUM SERPL-MCNC: 9.3 MG/DL (ref 8.6–10.3)
CHLORIDE SERPL-SCNC: 105 MMOL/L (ref 98–110)
CHOLEST SERPL-MCNC: 132 MG/DL
CHOLEST/HDLC SERPL: 3.3 (CALC)
CO2 SERPL-SCNC: 26 MMOL/L (ref 20–32)
CREAT SERPL-MCNC: 0.75 MG/DL (ref 0.7–1.35)
EOSINOPHIL # BLD AUTO: 48 CELLS/UL (ref 15–500)
EOSINOPHIL NFR BLD AUTO: 0.5 %
ERYTHROCYTE [DISTWIDTH] IN BLOOD BY AUTOMATED COUNT: 12.4 % (ref 11–15)
GFR/BSA.PRED SERPLBLD CYS-BASED-ARV: 103 ML/MIN/1.73M2
GLOBULIN SER CALC-MCNC: 2.6 G/DL (CALC) (ref 1.9–3.7)
GLUCOSE SERPL-MCNC: 110 MG/DL (ref 65–99)
HCT VFR BLD AUTO: 44.6 % (ref 38.5–50)
HDLC SERPL-MCNC: 40 MG/DL
HGB BLD-MCNC: 15.2 G/DL (ref 13.2–17.1)
LDLC SERPL CALC-MCNC: 71 MG/DL (CALC)
LYMPHOCYTES # BLD AUTO: 1920 CELLS/UL (ref 850–3900)
LYMPHOCYTES NFR BLD AUTO: 20 %
MCH RBC QN AUTO: 32.1 PG (ref 27–33)
MCHC RBC AUTO-ENTMCNC: 34.1 G/DL (ref 32–36)
MCV RBC AUTO: 94.1 FL (ref 80–100)
MONOCYTES # BLD AUTO: 758 CELLS/UL (ref 200–950)
MONOCYTES NFR BLD AUTO: 7.9 %
NEUTROPHILS # BLD AUTO: 6835 CELLS/UL (ref 1500–7800)
NEUTROPHILS NFR BLD AUTO: 71.2 %
NONHDLC SERPL-MCNC: 92 MG/DL (CALC)
PLATELET # BLD AUTO: 235 THOUSAND/UL (ref 140–400)
PMV BLD REES-ECKER: 10.3 FL (ref 7.5–12.5)
POTASSIUM SERPL-SCNC: 4.9 MMOL/L (ref 3.5–5.3)
PROT SERPL-MCNC: 6.9 G/DL (ref 6.1–8.1)
PSA FREE MFR SERPL: NORMAL % (CALC)
PSA FREE SERPL-MCNC: <0.1 NG/ML
PSA SERPL-MCNC: 0.2 NG/ML
RBC # BLD AUTO: 4.74 MILLION/UL (ref 4.2–5.8)
SODIUM SERPL-SCNC: 139 MMOL/L (ref 135–146)
TRIGL SERPL-MCNC: 129 MG/DL
TSH SERPL-ACNC: 1.18 MIU/L (ref 0.4–4.5)
WBC # BLD AUTO: 9.6 THOUSAND/UL (ref 3.8–10.8)

## 2023-08-17 NOTE — TELEPHONE ENCOUNTER
Yes, this is Ivis Ventura, my wife Marlene and I. We had appointments yesterday and we see several consistencies in our summary, our office summary, the doctor to call me and so I can discuss this with her because the office summary visit has very inaccurate. So appreciate it if you could give me a call to have it corrected. Thank you. Number is [phone]. Again, this is Ivis Ventura. Thank you.

## 2023-08-17 NOTE — TELEPHONE ENCOUNTER
I phoned. No answer. Phoned wife. Pt is sleeping. He can phone back at a better time and if I am available can speak to him.   Otherwise, will try back at a different time    Yovani Kennedy, DO

## 2023-09-13 DIAGNOSIS — E78.2 MIXED HYPERLIPIDEMIA: ICD-10-CM

## 2023-09-13 DIAGNOSIS — I10 PRIMARY HYPERTENSION: ICD-10-CM

## 2023-09-13 DIAGNOSIS — E03.9 HYPOTHYROIDISM (ACQUIRED): ICD-10-CM

## 2023-09-13 RX ORDER — LEVOTHYROXINE SODIUM 88 UG/1
88 TABLET ORAL DAILY
Qty: 90 TABLET | Refills: 0 | Status: SHIPPED | OUTPATIENT
Start: 2023-09-13

## 2023-09-13 RX ORDER — ROSUVASTATIN CALCIUM 40 MG/1
40 TABLET, COATED ORAL
Qty: 90 TABLET | Refills: 0 | Status: SHIPPED | OUTPATIENT
Start: 2023-09-13

## 2023-09-13 RX ORDER — AMLODIPINE AND OLMESARTAN MEDOXOMIL 5; 20 MG/1; MG/1
1 TABLET ORAL DAILY
Qty: 90 TABLET | Refills: 0 | Status: SHIPPED | OUTPATIENT
Start: 2023-09-13

## 2023-09-26 ENCOUNTER — TELEPHONE (OUTPATIENT)
Age: 58
End: 2023-09-26

## 2023-09-26 NOTE — TELEPHONE ENCOUNTER
Patient called to schedule new patient appointment. He will call back since most of the offices are to far for him.

## 2023-09-28 ENCOUNTER — OFFICE VISIT (OUTPATIENT)
Dept: UROLOGY | Facility: CLINIC | Age: 58
End: 2023-09-28
Payer: COMMERCIAL

## 2023-09-28 VITALS
HEART RATE: 70 BPM | WEIGHT: 291 LBS | DIASTOLIC BLOOD PRESSURE: 76 MMHG | TEMPERATURE: 98 F | HEIGHT: 69 IN | BODY MASS INDEX: 43.1 KG/M2 | OXYGEN SATURATION: 98 % | SYSTOLIC BLOOD PRESSURE: 132 MMHG

## 2023-09-28 DIAGNOSIS — R35.1 BENIGN PROSTATIC HYPERPLASIA WITH NOCTURIA: Primary | ICD-10-CM

## 2023-09-28 DIAGNOSIS — N20.0 CALCULUS OF KIDNEY: ICD-10-CM

## 2023-09-28 DIAGNOSIS — N40.1 BENIGN PROSTATIC HYPERPLASIA WITH NOCTURIA: Primary | ICD-10-CM

## 2023-09-28 PROCEDURE — 99203 OFFICE O/P NEW LOW 30 MIN: CPT | Performed by: UROLOGY

## 2023-09-28 PROCEDURE — 81003 URINALYSIS AUTO W/O SCOPE: CPT | Performed by: UROLOGY

## 2023-09-28 RX ORDER — TAMSULOSIN HYDROCHLORIDE 0.4 MG/1
0.4 CAPSULE ORAL
Qty: 90 CAPSULE | Refills: 3 | Status: SHIPPED | OUTPATIENT
Start: 2023-09-28

## 2023-09-28 NOTE — PROGRESS NOTES
UROLOGY PROGRESS NOTE         NAME: Morgan Saba  AGE: 59 y.o. SEX: male  : 1965   MRN: [id number]    DATE: 2023  TIME: 3:52 PM    Assessment and Plan      Impression:   1. Benign prostatic hyperplasia with nocturia    2. Calculus of kidney      History of some intermittent left-sided flank pain. Does not radiate. Mild nausea. Long history of stones. Last one a few months ago. History of BPH symptoms are somewhat bothersome. He has some urgency of urination voids every 2 hours during the day. Gets up a couple times a night to void. No incontinence. No hematuria.  Plan: We will get a CT scan. Noncontrast.  He is willing to retry the Flomax. We will see him back in a month to see how things progress      Chief Complaint     Chief Complaint   Patient presents with   • new pt   • Benign Prostatic Hypertrophy   • Nocturia   • pain left kidney daily   • Nephrolithiasis     History of Present Illness     HPI: Morgan Saba is a 59y.o. year old male who presents with history of BPH. History of active stone disease. Last 1 a few months ago. History of BPH. His family doctor put him on Flomax he was concerned about ejaculatory dysfunction and decided not to take it. He did throw the medicine away. We discussed the BPH pathology and what some of different treatment options and outcomes are. He is willing to try the Flomax again. PSA normal.  Renal ultrasound a few years ago showed some small left kidney stones no hydro-. .  We will kidney function.               The following portions of the patient's history were reviewed and updated as appropriate: allergies, current medications, past family history, past medical history, past social history, past surgical history and problem list.  Past Medical History:   Diagnosis Date   • Chronic bilateral low back pain without sciatica    • Cigar smoker    • Fatty liver    • Hypertension    • Memory change    • Mixed hyperlipidemia    • Non-insulin dependent type 2 diabetes mellitus ([address])    • Obstructive sleep apnea      History reviewed. No pertinent surgical history. shoulder  Review of Systems     Const: Denies chills, fever and weight loss. CV: Denies chest pain. Resp: Denies SOB. GI: Denies abdominal pain, nausea and vomiting. : Denies symptoms other than stated above. Musculo: Denies back pain. Objective   /76   Pulse 70   Temp 98 °F (36.7 °C)   Ht 5' 9" (1.753 m)   Wt 132 kg (291 lb)   SpO2 98%   BMI 42.97 kg/m²     Physical Exam  Const: Appears healthy and well developed. No signs of acute distress present. Resp: Respirations are regular and unlabored. CV: Rate is regular. Rhythm is regular. Abdomen: Abdomen is soft, nontender, and nondistended. Kidneys are not palpable. : Normal  exam.  Penis testicles epididymis normal.  Prostate 1+ benign. No hernias. Psych: Patient's attitude is cooperative.  Mood is normal. Affect is normal.    Current Medications     Current Outpatient Medications:   •  amlodipine-olmesartan (MANAS) 5-20 MG, Take 1 tablet by mouth daily, Disp: 90 tablet, Rfl: 0  •  Cholecalciferol 50 MCG (2000 UT) CAPS, Take 1 capsule (2,000 Units total) by mouth daily, Disp: 90 capsule, Rfl: 3  •  cyanocobalamin (VITAMIN B-12) 100 MCG tablet, Take by mouth, Disp: , Rfl:   •  ERGOCALCIFEROL PO, Take by mouth, Disp: , Rfl:   •  levothyroxine 88 mcg tablet, Take 1 tablet (88 mcg total) by mouth daily, Disp: 90 tablet, Rfl: 0  •  Multiple Vitamin (Multi-Vitamin) tablet, Take 1 tablet by mouth daily, Disp: , Rfl:   •  rosuvastatin (CRESTOR) 40 MG tablet, Take 1 tablet (40 mg total) by mouth daily with dinner, Disp: 90 tablet, Rfl: 0  •  Zinc 10 MG LOZG, Apply to the mouth or throat, Disp: , Rfl:         Rhina Winston MD

## 2023-09-29 LAB
SL AMB  POCT GLUCOSE, UA: ABNORMAL
SL AMB LEUKOCYTE ESTERASE,UA: ABNORMAL
SL AMB POCT BILIRUBIN,UA: ABNORMAL
SL AMB POCT BLOOD,UA: ABNORMAL
SL AMB POCT CLARITY,UA: CLEAR
SL AMB POCT COLOR,UA: YELLOW
SL AMB POCT KETONES,UA: ABNORMAL
SL AMB POCT NITRITE,UA: ABNORMAL
SL AMB POCT PH,UA: 6
SL AMB POCT SPECIFIC GRAVITY,UA: 1.03
SL AMB POCT URINE PROTEIN: ABNORMAL
SL AMB POCT UROBILINOGEN: 1

## 2023-10-05 ENCOUNTER — HOSPITAL ENCOUNTER (OUTPATIENT)
Dept: CT IMAGING | Facility: HOSPITAL | Age: 58
Discharge: HOME | End: 2023-10-05
Payer: COMMERCIAL

## 2023-10-05 DIAGNOSIS — N20.0 CALCULUS OF KIDNEY: ICD-10-CM

## 2023-10-05 DIAGNOSIS — N40.1 BENIGN PROSTATIC HYPERPLASIA WITH NOCTURIA: ICD-10-CM

## 2023-10-05 DIAGNOSIS — R35.1 BENIGN PROSTATIC HYPERPLASIA WITH NOCTURIA: ICD-10-CM

## 2023-10-05 PROCEDURE — 74176 CT ABD & PELVIS W/O CONTRAST: CPT

## 2023-10-05 PROCEDURE — G1004 CDSM NDSC: HCPCS

## 2023-10-14 PROBLEM — R09.89 THROAT CLEARING: Status: RESOLVED | Noted: 2023-08-15 | Resolved: 2023-10-14

## 2023-10-26 ENCOUNTER — OFFICE VISIT (OUTPATIENT)
Dept: UROLOGY | Facility: CLINIC | Age: 58
End: 2023-10-26
Payer: COMMERCIAL

## 2023-10-26 VITALS
WEIGHT: 289 LBS | DIASTOLIC BLOOD PRESSURE: 60 MMHG | TEMPERATURE: 97.6 F | BODY MASS INDEX: 42.8 KG/M2 | SYSTOLIC BLOOD PRESSURE: 126 MMHG | RESPIRATION RATE: 20 BRPM | OXYGEN SATURATION: 99 % | HEART RATE: 73 BPM | HEIGHT: 69 IN

## 2023-10-26 DIAGNOSIS — R35.1 BENIGN PROSTATIC HYPERPLASIA WITH NOCTURIA: Primary | ICD-10-CM

## 2023-10-26 DIAGNOSIS — N40.1 BENIGN PROSTATIC HYPERPLASIA WITH NOCTURIA: Primary | ICD-10-CM

## 2023-10-26 DIAGNOSIS — N20.0 CALCULUS OF KIDNEY: ICD-10-CM

## 2023-10-26 PROCEDURE — 99213 OFFICE O/P EST LOW 20 MIN: CPT | Performed by: UROLOGY

## 2023-10-26 RX ORDER — AMPICILLIN TRIHYDRATE 250 MG
100 CAPSULE ORAL DAILY
COMMUNITY

## 2023-10-26 NOTE — PROGRESS NOTES
UROLOGY PROGRESS NOTE         NAME: Wiley Wei  AGE: 59 y.o. SEX: male  : 1965   MRN: [id number]    DATE: 10/26/2023  TIME: 1:57 PM    Assessment and Plan      Impression:   1. Benign prostatic hyperplasia with nocturia  -     PSA Total, Diagnostic; Future  -     XR abdomen 1 view kub; Future; Expected date: 10/26/2023  -     Ambulatory referral to Nephrology; Future; Expected date: 10/27/2023    2. Calculus of kidney  -     PSA Total, Diagnostic; Future  -     XR abdomen 1 view kub; Future; Expected date: 10/26/2023  -     Ambulatory referral to Nephrology; Future; Expected date: 10/27/2023      Patient doing great on the Flomax. 2 stones in his left lower pole. Pain likely musculoskeletal.  He is agreeable to a nephrology consult. He will try to hydrate better.  Plan: Nephrology consult, continue Flomax, 1 year with a PSA and KUB. Sooner if troubles. Chief Complaint     Chief Complaint   Patient presents with    Follow-up     Patient here for a follow up today to review recent CT renal stone study, abdominal pelvis w/o contrast     History of Present Illness     HPI: Wiley Wei is a 59y.o. year old male who presents with history of kidney stones. Multiple stones over the past 6 to 7 years. Remaining stones left kidney on on CT scan. Voiding symptoms much improved on Flomax. Less urgency frequency and nocturia. Flow better.   PSA is normal.  Kidney function normal.              The following portions of the patient's history were reviewed and updated as appropriate: allergies, current medications, past family history, past medical history, past social history, past surgical history and problem list.  Past Medical History:   Diagnosis Date    Chronic bilateral low back pain without sciatica     Cigar smoker     Fatty liver     Hypertension     Memory change     Mixed hyperlipidemia     Non-insulin dependent type 2 diabetes mellitus ([address])     Obstructive sleep apnea History reviewed. No pertinent surgical history. shoulder  Review of Systems     Const: Denies chills, fever and weight loss. CV: Denies chest pain. Resp: Denies SOB. GI: Denies abdominal pain, nausea and vomiting. : Denies symptoms other than stated above. Musculo: Denies back pain. Objective   /60   Pulse 73   Temp 97.6 °F (36.4 °C) (Tympanic)   Resp 20   Ht 5' 9" (1.753 m)   Wt 131 kg (289 lb)   SpO2 99%   BMI 42.68 kg/m²     Physical Exam  Const: Appears healthy and well developed. No signs of acute distress present. Resp: Respirations are regular and unlabored. CV: Rate is regular. Rhythm is regular. Abdomen: Abdomen is soft, nontender, and nondistended. Kidneys are not palpable. : No exam.  Psych: Patient's attitude is cooperative.  Mood is normal. Affect is normal.    Current Medications     Current Outpatient Medications:     amlodipine-olmesartan (MANAS) 5-20 MG, Take 1 tablet by mouth daily, Disp: 90 tablet, Rfl: 0    Cholecalciferol 50 MCG (2000 UT) CAPS, Take 1 capsule (2,000 Units total) by mouth daily, Disp: 90 capsule, Rfl: 3    Co Q-10 50 MG, Take 100 mg by mouth in the morning, Disp: , Rfl:     cyanocobalamin (VITAMIN B-12) 100 MCG tablet, Take by mouth, Disp: , Rfl:     levothyroxine 88 mcg tablet, Take 1 tablet (88 mcg total) by mouth daily, Disp: 90 tablet, Rfl: 0    Multiple Vitamin (Multi-Vitamin) tablet, Take 1 tablet by mouth daily, Disp: , Rfl:     rosuvastatin (CRESTOR) 40 MG tablet, Take 1 tablet (40 mg total) by mouth daily with dinner, Disp: 90 tablet, Rfl: 0    Saw Palmetto 80 MG CAPS, Take 80 mg by mouth in the morning, Disp: , Rfl:     tamsulosin (FLOMAX) 0.4 mg, Take 1 capsule (0.4 mg total) by mouth daily with dinner, Disp: 90 capsule, Rfl: 3    Zinc 10 MG LOZG, Apply to the mouth or throat, Disp: , Rfl:     ERGOCALCIFEROL PO, Take by mouth (Patient not taking: Reported on 10/26/2023), Disp: , Rfl:         Woody Rom, MD

## 2023-11-01 ENCOUNTER — TELEPHONE (OUTPATIENT)
Dept: NEPHROLOGY | Facility: CLINIC | Age: 58
End: 2023-11-01

## 2023-11-01 ENCOUNTER — TELEPHONE (OUTPATIENT)
Dept: FAMILY MEDICINE CLINIC | Facility: CLINIC | Age: 58
End: 2023-11-01

## 2023-11-01 DIAGNOSIS — I10 ESSENTIAL HYPERTENSION: Primary | ICD-10-CM

## 2023-11-01 NOTE — TELEPHONE ENCOUNTER
Can you add urine test work up please?  Patient is a consult being seen 12/28/2023 by Dr. Brenda Brown for Calculus of kidney

## 2023-11-15 ENCOUNTER — OFFICE VISIT (OUTPATIENT)
Dept: FAMILY MEDICINE CLINIC | Facility: CLINIC | Age: 58
End: 2023-11-15
Payer: COMMERCIAL

## 2023-11-15 VITALS
OXYGEN SATURATION: 94 % | RESPIRATION RATE: 18 BRPM | DIASTOLIC BLOOD PRESSURE: 80 MMHG | HEART RATE: 64 BPM | BODY MASS INDEX: 43.86 KG/M2 | WEIGHT: 297 LBS | TEMPERATURE: 98.2 F | SYSTOLIC BLOOD PRESSURE: 132 MMHG

## 2023-11-15 DIAGNOSIS — R05.3 CHRONIC COUGH: ICD-10-CM

## 2023-11-15 DIAGNOSIS — N40.1 BENIGN PROSTATIC HYPERPLASIA WITH NOCTURIA: ICD-10-CM

## 2023-11-15 DIAGNOSIS — E03.9 HYPOTHYROIDISM (ACQUIRED): ICD-10-CM

## 2023-11-15 DIAGNOSIS — N20.0 CALCULUS OF KIDNEY: ICD-10-CM

## 2023-11-15 DIAGNOSIS — E08.00 DIABETES MELLITUS DUE TO UNDERLYING CONDITION WITH HYPEROSMOLARITY WITHOUT COMA, WITHOUT LONG-TERM CURRENT USE OF INSULIN (HCC): ICD-10-CM

## 2023-11-15 DIAGNOSIS — E78.2 MIXED HYPERLIPIDEMIA: ICD-10-CM

## 2023-11-15 DIAGNOSIS — I10 PRIMARY HYPERTENSION: ICD-10-CM

## 2023-11-15 DIAGNOSIS — Z23 ENCOUNTER FOR IMMUNIZATION: Primary | ICD-10-CM

## 2023-11-15 DIAGNOSIS — Z11.4 SCREENING FOR HIV (HUMAN IMMUNODEFICIENCY VIRUS): ICD-10-CM

## 2023-11-15 DIAGNOSIS — R35.1 BENIGN PROSTATIC HYPERPLASIA WITH NOCTURIA: ICD-10-CM

## 2023-11-15 PROCEDURE — 99396 PREV VISIT EST AGE 40-64: CPT

## 2023-11-15 RX ORDER — TAMSULOSIN HYDROCHLORIDE 0.4 MG/1
0.4 CAPSULE ORAL
Qty: 90 CAPSULE | Refills: 3 | Status: SHIPPED | OUTPATIENT
Start: 2023-11-15

## 2023-11-15 RX ORDER — AMLODIPINE AND OLMESARTAN MEDOXOMIL 5; 20 MG/1; MG/1
1 TABLET ORAL DAILY
Qty: 90 TABLET | Refills: 0 | Status: SHIPPED | OUTPATIENT
Start: 2023-11-15

## 2023-11-15 RX ORDER — ROSUVASTATIN CALCIUM 40 MG/1
40 TABLET, COATED ORAL
Qty: 90 TABLET | Refills: 0 | Status: SHIPPED | OUTPATIENT
Start: 2023-11-15

## 2023-11-15 RX ORDER — LEVOTHYROXINE SODIUM 88 UG/1
88 TABLET ORAL DAILY
Qty: 90 TABLET | Refills: 0 | Status: SHIPPED | OUTPATIENT
Start: 2023-11-15

## 2023-11-15 NOTE — ASSESSMENT & PLAN NOTE
Stable. Follow-up if any symptoms. Follow-up with A1c in 3 months.   Lab Results   Component Value Date    HGBA1C 6.2 08/15/2023

## 2023-11-15 NOTE — PROGRESS NOTES
ADULT ANNUAL [facility]    NAME: Keily Wagner  AGE: 59 y.o. SEX: male  : 1965     DATE: 11/15/2023     Assessment and Plan:     Problem List Items Addressed This Visit          Endocrine    Hypothyroidism (acquired)     Stable. Follow-up if any symptoms. Relevant Medications    levothyroxine 88 mcg tablet    Diabetes mellitus due to underlying condition with hyperosmolarity without coma, without long-term current use of insulin (HCC)     Stable. Follow-up if any symptoms. Follow-up with A1c in 3 months. Lab Results   Component Value Date    HGBA1C 6.2 08/15/2023          Relevant Orders    HEMOGLOBIN A1C W/ EAG ESTIMATION       Cardiovascular and Mediastinum    Primary hypertension     Stable. Follow-up if any issues. Relevant Medications    amlodipine-olmesartan (MANAS) 5-20 MG       Other    Mixed hyperlipidemia     Stable. Follow-up if any issues. Relevant Medications    rosuvastatin (CRESTOR) 40 MG tablet    Chronic cough     Patient given order for x-ray. Follow-up pending results. Relevant Orders    XR chest pa & lateral     Other Visit Diagnoses       Encounter for immunization    -  Primary    Declined immunizations. Risks and benefits discussed. Screening for HIV (human immunodeficiency virus)        Declined screening. Calculus of kidney        Relevant Medications    tamsulosin (FLOMAX) 0.4 mg    Benign prostatic hyperplasia with nocturia        Stable. Follow-up if any issues. Relevant Medications    tamsulosin (FLOMAX) 0.4 mg            Immunizations and preventive care screenings were discussed with patient today. Appropriate education was printed on patient's after visit summary. Discussed risks and benefits of prostate cancer screening.  We discussed the controversial history of PSA screening for prostate cancer in the [facility] as well as the risk of over detection and over treatment of prostate cancer by way of PSA screening. The patient understands that PSA blood testing is an imperfect way to screen for prostate cancer and that elevated PSA levels in the blood may also be caused by infection, inflammation, prostatic trauma or manipulation, urological procedures, or by benign prostatic enlargement. The role of the digital rectal examination in prostate cancer screening was also discussed and I discussed with him that there is large interobserver variability in the findings of digital rectal examination. Counseling:  Alcohol/drug use: discussed moderation in alcohol intake, the recommendations for healthy alcohol use, and avoidance of illicit drug use. Dental Health: discussed importance of regular tooth brushing, flossing, and dental visits. Injury prevention: discussed safety/seat belts, safety helmets, smoke detectors, carbon dioxide detectors, and smoking near bedding or upholstery. Sexual health: discussed sexually transmitted diseases, partner selection, use of condoms, avoidance of unintended pregnancy, and contraceptive alternatives. Exercise: the importance of regular exercise/physical activity was discussed. Recommend exercise 3-5 times per week for at least 30 minutes. Depression Screening and Follow-up Plan: Patient was screened for depression during today's encounter. They screened negative with a PHQ-2 score of 0. Return in about 3 months (around 2/15/2024). Chief Complaint:     Chief Complaint   Patient presents with    Physical Exam      History of Present Illness:     Adult Annual Physical   Patient here for a comprehensive physical exam. The patient reports problems - chronic cough for 6 months. Productive cough. Denies hemoptysis, symptoms of URI, chest pain, sick contacts. Patient tried antihistamine for cough with no help. .    Diet and Physical Activity  Diet/Nutrition: well balanced diet.    Exercise: no formal exercise. Depression Screening  PHQ-2/9 Depression Screening    Little interest or pleasure in doing things: 0 - not at all  Feeling down, depressed, or hopeless: 0 - not at all  PHQ-2 Score: 0  PHQ-2 Interpretation: Negative depression screen       General Health  Sleep: sleeps well. Hearing: normal - bilateral.  Vision: no vision problems and wears glasses. Dental: regular dental visits.  Health  Symptoms include: none    Advanced Care Planning  Do you have an advanced directive? no  Do you have a durable medical power of ? no     Review of Systems:     Review of Systems   Constitutional:  Negative for appetite change, chills, diaphoresis, fatigue and fever. HENT:  Negative for congestion, ear pain, rhinorrhea, sinus pressure, sinus pain, sneezing and sore throat. Eyes:  Negative for pain, discharge, redness, itching and visual disturbance. Respiratory:  Positive for cough (6 months. Productive. ). Negative for apnea, chest tightness and shortness of breath. Cardiovascular:  Negative for chest pain, palpitations and leg swelling. Gastrointestinal:  Negative for abdominal pain, blood in stool, constipation, diarrhea, nausea and vomiting. Endocrine: Negative for cold intolerance, heat intolerance, polydipsia and polyuria. Genitourinary:  Negative for difficulty urinating, dysuria, flank pain, frequency, hematuria, penile discharge, penile pain, penile swelling, scrotal swelling, testicular pain and urgency. Musculoskeletal:  Negative for arthralgias, back pain, joint swelling, myalgias, neck pain and neck stiffness. Skin:  Negative for color change and rash. Allergic/Immunologic: Negative. Neurological:  Negative for dizziness, tremors, seizures, syncope, weakness, light-headedness, numbness and headaches. Hematological: Negative.     Psychiatric/Behavioral:  Negative for agitation, confusion, decreased concentration, dysphoric mood, hallucinations and sleep disturbance. The patient is not nervous/anxious. All other systems reviewed and are negative. Past Medical History:     Past Medical History:   Diagnosis Date    Chronic bilateral low back pain without sciatica     Cigar smoker     Fatty liver     Hypertension     Memory change     Mixed hyperlipidemia     Non-insulin dependent type 2 diabetes mellitus (HCC)     Obstructive sleep apnea       Past Surgical History:     History reviewed. No pertinent surgical history. Family History:     Family History   Problem Relation Age of Onset    Stroke Mother     Heart disease Father       Social History:     Social History     Socioeconomic History    Marital status: /Civil Union     Spouse name: None    Number of children: None    Years of education: None    Highest education level: None   Occupational History    None   Tobacco Use    Smoking status: Some Days     Types: Cigars    Smokeless tobacco: Never   Vaping Use    Vaping Use: Never used   Substance and Sexual Activity    Alcohol use: Not Currently    Drug use: Never    Sexual activity: Yes     Partners: Female     Comment:    Other Topics Concern    None   Social History Narrative    None     Social Determinants of Health     Financial Resource Strain: Low Risk  (9/30/2022)    Overall Financial Resource Strain (CARDIA)     Difficulty of Paying Living Expenses: Not hard at all   Food Insecurity: No Food Insecurity (9/30/2022)    Hunger Vital Sign     Worried About Running Out of Food in the Last Year: Never true     Ran Out of Food in the Last Year: Never true   Transportation Needs: No Transportation Needs (9/30/2022)    PRAPARE - Transportation     Lack of Transportation (Medical): No     Lack of Transportation (Non-Medical):  No   Physical Activity: Inactive (9/30/2022)    Exercise Vital Sign     Days of Exercise per Week: 5 days     Minutes of Exercise per Session: 0 min   Stress: No Stress Concern Present (9/30/2022)    [employer] of Occupational Health - Occupational Stress Questionnaire     Feeling of Stress : Not at all   Social Connections:  Moderately Isolated (9/30/2022)    Social Connection and Isolation Panel [NHANES]     Frequency of Communication with Friends and Family: More than three times a week     Frequency of Social Gatherings with Friends and Family: Once a week     Attends Orthodox Services: Never     Active Member of Clubs or Organizations: No     Attends Club or Organization Meetings: Never     Marital Status:    Intimate Partner Violence: Not At Risk (9/30/2022)    Humiliation, Afraid, Rape, and Kick questionnaire     Fear of Current or Ex-Partner: No     Emotionally Abused: No     Physically Abused: No     Sexually Abused: No   Housing Stability: [address]  (9/30/2022)    Housing Stability Vital Sign     Unable to Pay for Housing in the Last Year: No     Number of [address] in the Last Year: 1     Unstable Housing in the Last Year: No      Current Medications:     Current Outpatient Medications   Medication Sig Dispense Refill    amlodipine-olmesartan (MANAS) 5-20 MG Take 1 tablet by mouth daily 90 tablet 0    Cholecalciferol 50 MCG (2000 UT) CAPS Take 1 capsule (2,000 Units total) by mouth daily 90 capsule 3    Co Q-10 50 MG Take 100 mg by mouth in the morning      cyanocobalamin (VITAMIN B-12) 100 MCG tablet Take by mouth      levothyroxine 88 mcg tablet Take 1 tablet (88 mcg total) by mouth daily 90 tablet 0    Multiple Vitamin (Multi-Vitamin) tablet Take 1 tablet by mouth daily      rosuvastatin (CRESTOR) 40 MG tablet Take 1 tablet (40 mg total) by mouth daily with dinner 90 tablet 0    Saw [place] 80 MG CAPS Take 80 mg by mouth in the morning      tamsulosin (FLOMAX) 0.4 mg Take 1 capsule (0.4 mg total) by mouth daily with dinner 90 capsule 3    Zinc 10 MG LOZG Apply to the mouth or throat      ERGOCALCIFEROL PO Take by mouth (Patient not taking: Reported on 10/26/2023)       No current facility-administered medications for this visit. Allergies: Allergies   Allergen Reactions    Ozempic (0.25 Or 0.5 Mg-Dose) [Semaglutide(0.25 Or 0.5mg-Dos)] Abdominal Pain     pancreatitis      Physical Exam:     /80   Pulse 64   Temp 98.2 °F (36.8 °C) (Tympanic)   Resp 18   Wt 135 kg (297 lb)   SpO2 94%   BMI 43.86 kg/m²     Physical Exam  Vitals and nursing note reviewed. Constitutional:       General: He is not in acute distress. Appearance: Normal appearance. He is well-developed and normal weight. HENT:      Head: Normocephalic and atraumatic. Right Ear: Tympanic membrane normal.      Left Ear: Tympanic membrane normal.      Nose: Nose normal.      Mouth/Throat:      Mouth: Mucous membranes are moist.      Pharynx: Oropharynx is clear. Eyes:      Extraocular Movements: Extraocular movements intact. Conjunctiva/sclera: Conjunctivae normal.      Pupils: Pupils are equal, round, and reactive to light. Cardiovascular:      Rate and Rhythm: Normal rate and regular rhythm. Pulses: Normal pulses. Heart sounds: Normal heart sounds. No murmur heard. Pulmonary:      Effort: Pulmonary effort is normal. No respiratory distress. Breath sounds: Normal breath sounds. Abdominal:      General: Bowel sounds are normal.      Palpations: Abdomen is soft. Tenderness: There is no abdominal tenderness. Musculoskeletal:         General: No swelling or tenderness. Normal range of motion. Cervical back: Normal range of motion and neck supple. Right lower leg: No edema. Left lower leg: No edema. Skin:     General: Skin is warm and dry. Capillary Refill: Capillary refill takes less than 2 seconds. Neurological:      General: No focal deficit present. Mental Status: He is alert and oriented to person, place, and time. Motor: No weakness. Psychiatric:         Mood and Affect: Mood normal.         Behavior: Behavior normal.         Thought Content:  Thought content normal.         Judgment: Judgment normal.          Jef Salcido PA-C  [address] [place] [address]

## 2023-12-01 LAB
EST. AVERAGE GLUCOSE BLD GHB EST-MCNC: 123 MG/DL
EST. AVERAGE GLUCOSE BLD GHB EST-SCNC: 6.8 MMOL/L
HBA1C MFR BLD: 5.9 % OF TOTAL HGB

## 2023-12-20 DIAGNOSIS — R35.1 BENIGN PROSTATIC HYPERPLASIA WITH NOCTURIA: ICD-10-CM

## 2023-12-20 DIAGNOSIS — N40.1 BENIGN PROSTATIC HYPERPLASIA WITH NOCTURIA: ICD-10-CM

## 2023-12-20 DIAGNOSIS — N20.0 CALCULUS OF KIDNEY: ICD-10-CM

## 2023-12-20 NOTE — TELEPHONE ENCOUNTER
Patient wishes for Dr. D'Amico to refill the Tamsulosin, please send to [facility].     Reason for call:   [x] Refill   [] Prior Auth  [] Other:     Office:   [] PCP/Provider -   [x] Specialty/Provider - D'Amico    Medication:   tamsulosin (FLOMAX) 0.4 mg      Dose/Frequency: Take 1 capsule (0.4 mg total) by mouth daily with dinner     Quantity: 90    Pharm[facility]    Does the patient have enough for 3 days?   [x] Yes   [] No - Send as HP to POD

## 2023-12-21 RX ORDER — TAMSULOSIN HYDROCHLORIDE 0.4 MG/1
0.4 CAPSULE ORAL
Qty: 90 CAPSULE | Refills: 3 | Status: SHIPPED | OUTPATIENT
Start: 2023-12-21

## 2023-12-23 DIAGNOSIS — E78.2 MIXED HYPERLIPIDEMIA: ICD-10-CM

## 2023-12-23 DIAGNOSIS — I10 PRIMARY HYPERTENSION: ICD-10-CM

## 2023-12-23 DIAGNOSIS — E03.9 HYPOTHYROIDISM (ACQUIRED): ICD-10-CM

## 2023-12-26 RX ORDER — ROSUVASTATIN CALCIUM 40 MG/1
40 TABLET, COATED ORAL
Qty: 90 TABLET | Refills: 0 | Status: SHIPPED | OUTPATIENT
Start: 2023-12-26

## 2023-12-26 RX ORDER — AMLODIPINE AND OLMESARTAN MEDOXOMIL 5; 20 MG/1; MG/1
1 TABLET ORAL DAILY
Qty: 90 TABLET | Refills: 0 | Status: SHIPPED | OUTPATIENT
Start: 2023-12-26

## 2023-12-26 RX ORDER — LEVOTHYROXINE SODIUM 88 MCG
88 TABLET ORAL DAILY
Qty: 90 TABLET | Refills: 0 | Status: SHIPPED | OUTPATIENT
Start: 2023-12-26

## 2024-01-10 ENCOUNTER — CONSULT (OUTPATIENT)
Dept: SURGERY | Facility: CLINIC | Age: 59
End: 2024-01-10
Payer: COMMERCIAL

## 2024-01-10 VITALS
BODY MASS INDEX: 44.88 KG/M2 | HEIGHT: 69 IN | DIASTOLIC BLOOD PRESSURE: 72 MMHG | SYSTOLIC BLOOD PRESSURE: 130 MMHG | TEMPERATURE: 98.2 F | WEIGHT: 303 LBS | HEART RATE: 68 BPM | OXYGEN SATURATION: 94 %

## 2024-01-10 DIAGNOSIS — R10.32 LEFT GROIN PAIN: Primary | ICD-10-CM

## 2024-01-10 PROCEDURE — 99203 OFFICE O/P NEW LOW 30 MIN: CPT | Performed by: SURGERY

## 2024-01-10 NOTE — PROGRESS NOTES
Assessment/Plan:    Left groin pain  Patient has a history of open bilateral inguinal hernia repair in the 80s.  He reports that 1 month ago while trying to lift his dog he felt the pop/tear in the left groin.  He reports that initially it was painful although it did resolve.  He does report intermittent pain with exertion as well as a feeling that his left testicle is high riding.  He denies any obstructive symptoms or skin changes in that area.  On exam there is no evidence of a discrete hernia in the left inguinal region.  Given his history of prior repair we feel as though the best neck step would be to obtain a CT to further define his left inguinal anatomy prior to any operative consideration.     Diagnoses and all orders for this visit:    Left groin pain  -     CT abdomen pelvis w contrast; Future  -     Comprehensive metabolic panel; Future            Imaging:  Abdominal CT was reviewed from 10/5/2023 without any evidence of recurrent left inguinal hernia.    Subjective:      Patient ID: Cachorro Gray is a 58 y.o. male.    1-year-old male with a past medical history of DM2 not on any current hyperglycemic regimen, BPH, HTN, hypothyroidism, and remote history of bilateral inguinal hernias s/p open repair in the 80s.  He reports that since the repair he had no issues in terms of hernia recurrence or pain.  He does report that they were large hernias both involving bowel and the scrotum.  He reports that approximately 1 month ago while trying to lift his dog he felt a pop/tear in the left inguinal region.  After which he had intermittent pain as well as a feeling of a high riding left testicle.  He denies any obstructive symptoms or skin changes.  He denies fevers, chills, chest pain, shortness of breath.  He is not currently taking any anticoagulation or antiplatelet therapy.  Presented with concern of recurrence of his left inguinal hernia.        The following portions of the patient's history were  reviewed and updated as appropriate: He  has a past medical history of Chronic bilateral low back pain without sciatica, Cigar smoker, Fatty liver, Hypertension, Memory change, Mixed hyperlipidemia, Non-insulin dependent type 2 diabetes mellitus (HCC), and Obstructive sleep apnea.  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  His family history includes Heart disease in his father; Stroke in his mother.  He  reports that he has been smoking cigars. He has never used smokeless tobacco. He reports that he does not currently use alcohol. He reports that he does not use drugs.  Current Outpatient Medications   Medication Sig Dispense Refill    amlodipine-olmesartan (MANAS) 5-20 MG TAKE 1 TABLET DAILY 90 tablet 0    Cholecalciferol 50 MCG (2000 UT) CAPS Take 1 capsule (2,000 Units total) by mouth daily 90 capsule 3    Co Q-10 50 MG Take 100 mg by mouth in the morning      cyanocobalamin (VITAMIN B-12) 100 MCG tablet Take by mouth      Multiple Vitamin (Multi-Vitamin) tablet Take 1 tablet by mouth daily       "rosuvastatin (CRESTOR) 40 MG tablet TAKE 1 TABLET DAILY WITH   DINNER 90 tablet 0    Saw [place] 80 MG CAPS Take 80 mg by mouth in the morning      Synthroid 88 MCG tablet TAKE 1 TABLET DAILY 90 tablet 0    tamsulosin (FLOMAX) 0.4 mg Take 1 capsule (0.4 mg total) by mouth daily with dinner 90 capsule 3    Zinc 10 MG LOZG Apply to the mouth or throat      ERGOCALCIFEROL PO Take by mouth (Patient not taking: Reported on 10/26/2023)       No current facility-administered medications for this visit.     He is allergic to ozempic (0.25 or 0.5 mg-dose) [semaglutide(0.25 or 0.5mg-dos)]..    Review of Systems    Complete review of systems obtained; positive as in the above HPI, otherwise negative    Objective:      /72 (BP Location: Left arm, Patient Position: Sitting, Cuff Size: Standard)   Pulse 68   Temp 98.2 °F (36.8 °C) (Temporal)   Ht 5' 9\" (1.753 m)   Wt (!) 137 kg (303 lb)   SpO2 94%   BMI 44.75 kg/m²          Physical Exam    General: NAD  Skin: Warm, dry, anicteric  HEENT: Normocephalic, atraumatic  CV: RRR  Pulm:  Nonlabored breathing on room air  Abd: Soft, ND/NT: Well-healed linear scars in the bilateral inguinal region, no evidence of hernia recurrence, bilateral testicles in normal position.  MSK: Symmetric, no edema, no tenderness, no deformity  Neuro: AOx3, GCS 15    "

## 2024-01-10 NOTE — ASSESSMENT & PLAN NOTE
Patient has a history of open bilateral inguinal hernia repair in the 80s.  He reports that 1 month ago while trying to lift his dog he felt the pop/tear in the left groin.  He reports that initially it was painful although it did resolve.  He does report intermittent pain with exertion as well as a feeling that his left testicle is high riding.  He denies any obstructive symptoms or skin changes in that area.  On exam there is no evidence of a discrete hernia in the left inguinal region.  Given his history of prior repair we feel as though the best neck step would be to obtain a CT to further define his left inguinal anatomy prior to any operative consideration.

## 2024-01-17 ENCOUNTER — HOSPITAL ENCOUNTER (OUTPATIENT)
Dept: CT IMAGING | Facility: HOSPITAL | Age: 59
Discharge: HOME | End: 2024-01-17
Attending: SURGERY
Payer: COMMERCIAL

## 2024-01-17 ENCOUNTER — APPOINTMENT (OUTPATIENT)
Dept: LAB | Facility: HOSPITAL | Age: 59
End: 2024-01-17
Attending: SURGERY
Payer: COMMERCIAL

## 2024-01-17 DIAGNOSIS — R10.32 LEFT GROIN PAIN: ICD-10-CM

## 2024-01-17 LAB
ALBUMIN SERPL BCP-MCNC: 4.3 G/DL (ref 3.5–5)
ALP SERPL-CCNC: 99 U/L (ref 34–104)
ALT SERPL W P-5'-P-CCNC: 34 U/L (ref 7–52)
ANION GAP SERPL CALCULATED.3IONS-SCNC: 8 MMOL/L
AST SERPL W P-5'-P-CCNC: 24 U/L (ref 13–39)
BILIRUB SERPL-MCNC: 0.59 MG/DL (ref 0.2–1)
BUN SERPL-MCNC: 17 MG/DL (ref 5–25)
CALCIUM SERPL-MCNC: 9.7 MG/DL (ref 8.4–10.2)
CHLORIDE SERPL-SCNC: 104 MMOL/L (ref 96–108)
CO2 SERPL-SCNC: 27 MMOL/L (ref 21–32)
CREAT SERPL-MCNC: 0.74 MG/DL (ref 0.6–1.3)
GFR SERPL CREATININE-BSD FRML MDRD: 100 ML/MIN/1.73SQ M
GLUCOSE SERPL-MCNC: 94 MG/DL (ref 65–140)
POTASSIUM SERPL-SCNC: 4.7 MMOL/L (ref 3.5–5.3)
PROT SERPL-MCNC: 7.1 G/DL (ref 6.4–8.4)
SODIUM SERPL-SCNC: 139 MMOL/L (ref 135–147)

## 2024-01-17 PROCEDURE — 74177 CT ABD & PELVIS W/CONTRAST: CPT

## 2024-01-17 PROCEDURE — 80053 COMPREHEN METABOLIC PANEL: CPT

## 2024-01-17 PROCEDURE — 36415 COLL VENOUS BLD VENIPUNCTURE: CPT

## 2024-01-17 RX ADMIN — IOHEXOL 100 ML: 350 INJECTION, SOLUTION INTRAVENOUS at 14:37

## 2024-02-15 ENCOUNTER — OFFICE VISIT (OUTPATIENT)
Dept: FAMILY MEDICINE CLINIC | Facility: CLINIC | Age: 59
End: 2024-02-15
Payer: COMMERCIAL

## 2024-02-15 VITALS
DIASTOLIC BLOOD PRESSURE: 80 MMHG | WEIGHT: 305 LBS | HEIGHT: 69 IN | HEART RATE: 63 BPM | SYSTOLIC BLOOD PRESSURE: 152 MMHG | BODY MASS INDEX: 45.18 KG/M2 | OXYGEN SATURATION: 92 % | TEMPERATURE: 97.7 F

## 2024-02-15 DIAGNOSIS — N20.0 RENAL STONES: ICD-10-CM

## 2024-02-15 DIAGNOSIS — E03.9 HYPOTHYROIDISM (ACQUIRED): ICD-10-CM

## 2024-02-15 DIAGNOSIS — E53.8 B12 DEFICIENCY: ICD-10-CM

## 2024-02-15 DIAGNOSIS — E11.42 TYPE 2 DIABETES MELLITUS WITH DIABETIC POLYNEUROPATHY, WITHOUT LONG-TERM CURRENT USE OF INSULIN (HCC): Primary | ICD-10-CM

## 2024-02-15 DIAGNOSIS — E78.2 MIXED HYPERLIPIDEMIA: ICD-10-CM

## 2024-02-15 DIAGNOSIS — I10 PRIMARY HYPERTENSION: ICD-10-CM

## 2024-02-15 PROCEDURE — 99213 OFFICE O/P EST LOW 20 MIN: CPT

## 2024-02-15 NOTE — ASSESSMENT & PLAN NOTE
Continue tamsulosin 0.4 mg with dinner.  Patient told to go to emergency department if symptoms of renal stones present.

## 2024-02-15 NOTE — PROGRESS NOTES
Name: Cachorro Gray      : 1965      MRN: [id number]  Encounter Provider: Jef Salcido PA-C  Encounter Date: 2/15/2024   Encounter department: [facility]    Assessment & Plan     1. Type 2 diabetes mellitus with diabetic polyneuropathy, without long-term current use of insulin (HCC)  Assessment & Plan:  Last A1c was 5.9 without on any medication treatment.  Continue healthy diet and exercise.  Lab Results   Component Value Date    HGBA1C 5.9 (H) 2023         2. Hypothyroidism (acquired)  Assessment & Plan:  Stable.  Continue Synthroid 88 mcg daily.  Will continue to monitor with lab work.      3. Primary hypertension  Assessment & Plan:  Slightly elevated today.  No medication changes today.  Patient encouraged to take blood pressure at home occasionally or if symptoms of hypotension or hypertension.  Will follow-up at next visit.      4. Renal stones  Assessment & Plan:  Continue tamsulosin 0.4 mg with dinner.  Patient told to go to emergency department if symptoms of renal stones present.      5. Mixed hyperlipidemia  Assessment & Plan:  Stable.  Continue rosuvastatin 40 mg daily.  Will continue to monitor with lab work.      6. B12 deficiency  Assessment & Plan:  Continue supplement.  Will continue to monitor.             Subjective      Patient is a 58-year-old male presenting with CDM follow-up.  Patient has no concerns today.  Patient last A1c was 5.9.  He had a CMP done since last visit which was normal.  Patient has no concerns today.      Review of Systems   Constitutional:  Negative for appetite change, chills, diaphoresis, fatigue and fever.   HENT:  Negative for congestion, ear discharge, ear pain, postnasal drip, rhinorrhea, sinus pressure, sinus pain, sneezing and sore throat.    Eyes:  Negative for pain, discharge, redness, itching and visual disturbance.   Respiratory:  Negative for apnea, cough, chest tightness, shortness of breath and wheezing.     Cardiovascular:  Negative for chest pain, palpitations and leg swelling.   Gastrointestinal:  Negative for abdominal pain, blood in stool, constipation, diarrhea, nausea and vomiting.   Endocrine: Negative for cold intolerance, heat intolerance, polydipsia and polyuria.   Genitourinary:  Negative for dysuria, flank pain, frequency, hematuria and urgency.   Musculoskeletal:  Negative for arthralgias, back pain, myalgias, neck pain and neck stiffness.   Skin:  Negative for color change and rash.   Allergic/Immunologic: Negative.    Neurological:  Negative for dizziness, tremors, seizures, syncope, facial asymmetry, speech difficulty, weakness, light-headedness, numbness and headaches.   Hematological:  Negative for adenopathy. Does not bruise/bleed easily.   Psychiatric/Behavioral:  Negative for agitation, confusion, decreased concentration, dysphoric mood, hallucinations, self-injury, sleep disturbance and suicidal ideas. The patient is not nervous/anxious and is not hyperactive.    All other systems reviewed and are negative.      Current Outpatient Medications on File Prior to Visit   Medication Sig    amlodipine-olmesartan (MANAS) 5-20 MG TAKE 1 TABLET DAILY    Cholecalciferol 50 MCG (2000 UT) CAPS Take 1 capsule (2,000 Units total) by mouth daily    Co Q-10 50 MG Take 100 mg by mouth in the morning    cyanocobalamin (VITAMIN B-12) 100 MCG tablet Take by mouth    Multiple Vitamin (Multi-Vitamin) tablet Take 1 tablet by mouth daily    rosuvastatin (CRESTOR) 40 MG tablet TAKE 1 TABLET DAILY WITH   DINNER    Saw [place] 80 MG CAPS Take 80 mg by mouth in the morning    Synthroid 88 MCG tablet TAKE 1 TABLET DAILY    tamsulosin (FLOMAX) 0.4 mg Take 1 capsule (0.4 mg total) by mouth daily with dinner    Zinc 10 MG LOZG Apply to the mouth or throat    [DISCONTINUED] ERGOCALCIFEROL PO Take by mouth (Patient not taking: Reported on 10/26/2023)       Objective     /80 (BP Location: Left arm, Patient Position:  "Sitting)   Pulse 63   Temp 97.7 °F (36.5 °C) (Tympanic)   Ht 5' 9\" (1.753 m)   Wt (!) 138 kg (305 lb)   SpO2 92%   BMI 45.04 kg/m²     Physical Exam  Vitals and nursing note reviewed.   Constitutional:       General: He is not in acute distress.     Appearance: Normal appearance. He is obese. He is not ill-appearing, toxic-appearing or diaphoretic.   HENT:      Head: Normocephalic and atraumatic.      Right Ear: Tympanic membrane normal.      Left Ear: Tympanic membrane normal.      Nose: Nose normal.      Mouth/Throat:      Mouth: Mucous membranes are moist.      Pharynx: Oropharynx is clear.   Eyes:      Extraocular Movements: Extraocular movements intact.      Conjunctiva/sclera: Conjunctivae normal.      Pupils: Pupils are equal, round, and reactive to light.   Cardiovascular:      Rate and Rhythm: Normal rate and regular rhythm.      Pulses: Normal pulses.      Heart sounds: Normal heart sounds. No murmur heard.  Pulmonary:      Effort: Pulmonary effort is normal. No respiratory distress.      Breath sounds: Normal breath sounds. No wheezing.   Chest:      Chest wall: No tenderness.   Abdominal:      General: Bowel sounds are normal.      Palpations: Abdomen is soft. There is no mass.      Tenderness: There is no abdominal tenderness.   Musculoskeletal:         General: No tenderness. Normal range of motion.      Cervical back: Normal range of motion. No tenderness.      Right lower leg: No edema.      Left lower leg: No edema.   Lymphadenopathy:      Cervical: No cervical adenopathy.   Skin:     General: Skin is warm.      Capillary Refill: Capillary refill takes less than 2 seconds.      Findings: No erythema or rash.   Neurological:      General: No focal deficit present.      Mental Status: He is alert and oriented to person, place, and time. Mental status is at baseline.      Motor: No weakness.      Coordination: Coordination normal.      Gait: Gait normal.   Psychiatric:         Mood and Affect: " Mood normal.         Behavior: Behavior normal.         Thought Content: Thought content normal.         Judgment: Judgment normal.       Jef Salcido PA-C

## 2024-02-15 NOTE — ASSESSMENT & PLAN NOTE
Slightly elevated today.  No medication changes today.  Patient encouraged to take blood pressure at home occasionally or if symptoms of hypotension or hypertension.  Will follow-up at next visit.

## 2024-02-15 NOTE — ASSESSMENT & PLAN NOTE
Last A1c was 5.9 without on any medication treatment.  Continue healthy diet and exercise.  Lab Results   Component Value Date    HGBA1C 5.9 (H) 11/30/2023

## 2024-03-12 DIAGNOSIS — E03.9 HYPOTHYROIDISM (ACQUIRED): ICD-10-CM

## 2024-03-12 DIAGNOSIS — E78.2 MIXED HYPERLIPIDEMIA: ICD-10-CM

## 2024-03-12 DIAGNOSIS — I10 PRIMARY HYPERTENSION: ICD-10-CM

## 2024-03-12 RX ORDER — AMLODIPINE AND OLMESARTAN MEDOXOMIL 5; 20 MG/1; MG/1
1 TABLET ORAL DAILY
Qty: 90 TABLET | Refills: 1 | Status: SHIPPED | OUTPATIENT
Start: 2024-03-12

## 2024-03-12 RX ORDER — ROSUVASTATIN CALCIUM 40 MG/1
40 TABLET, COATED ORAL
Qty: 90 TABLET | Refills: 1 | Status: SHIPPED | OUTPATIENT
Start: 2024-03-12

## 2024-03-12 RX ORDER — LEVOTHYROXINE SODIUM 88 MCG
88 TABLET ORAL DAILY
Qty: 90 TABLET | Refills: 1 | Status: SHIPPED | OUTPATIENT
Start: 2024-03-12

## 2024-04-04 ENCOUNTER — TELEPHONE (OUTPATIENT)
Dept: FAMILY MEDICINE CLINIC | Facility: CLINIC | Age: 59
End: 2024-04-04

## 2024-04-04 NOTE — TELEPHONE ENCOUNTER
Left vm with pt's wife to call the office to reschedule [facility]

## 2024-05-14 ENCOUNTER — TELEPHONE (OUTPATIENT)
Dept: FAMILY MEDICINE CLINIC | Facility: CLINIC | Age: 59
End: 2024-05-14

## 2024-06-11 ENCOUNTER — TELEPHONE (OUTPATIENT)
Dept: FAMILY MEDICINE CLINIC | Facility: CLINIC | Age: 59
End: 2024-06-11

## 2024-07-17 ENCOUNTER — OFFICE VISIT (OUTPATIENT)
Dept: FAMILY MEDICINE CLINIC | Facility: CLINIC | Age: 59
End: 2024-07-17
Payer: COMMERCIAL

## 2024-07-17 VITALS
DIASTOLIC BLOOD PRESSURE: 76 MMHG | SYSTOLIC BLOOD PRESSURE: 128 MMHG | HEART RATE: 78 BPM | OXYGEN SATURATION: 98 % | TEMPERATURE: 97.5 F | WEIGHT: 303 LBS | RESPIRATION RATE: 18 BRPM | HEIGHT: 69 IN | BODY MASS INDEX: 44.88 KG/M2

## 2024-07-17 DIAGNOSIS — E03.9 HYPOTHYROIDISM (ACQUIRED): ICD-10-CM

## 2024-07-17 DIAGNOSIS — E08.00 DIABETES MELLITUS DUE TO UNDERLYING CONDITION WITH HYPEROSMOLARITY WITHOUT COMA, WITHOUT LONG-TERM CURRENT USE OF INSULIN (HCC): ICD-10-CM

## 2024-07-17 DIAGNOSIS — I10 PRIMARY HYPERTENSION: ICD-10-CM

## 2024-07-17 DIAGNOSIS — R73.03 PREDIABETES: ICD-10-CM

## 2024-07-17 DIAGNOSIS — E78.2 MIXED HYPERLIPIDEMIA: ICD-10-CM

## 2024-07-17 DIAGNOSIS — E11.42 TYPE 2 DIABETES MELLITUS WITH DIABETIC POLYNEUROPATHY, WITHOUT LONG-TERM CURRENT USE OF INSULIN (HCC): Primary | ICD-10-CM

## 2024-07-17 PROBLEM — M17.0 PRIMARY OSTEOARTHRITIS OF BOTH KNEES: Status: ACTIVE | Noted: 2022-02-17

## 2024-07-17 LAB
LEFT EYE DIABETIC RETINOPATHY: NORMAL
LEFT EYE IMAGE QUALITY: NORMAL
LEFT EYE MACULAR EDEMA: NORMAL
LEFT EYE OTHER RETINOPATHY: NORMAL
RIGHT EYE DIABETIC RETINOPATHY: NORMAL
RIGHT EYE IMAGE QUALITY: NORMAL
RIGHT EYE MACULAR EDEMA: NORMAL
RIGHT EYE OTHER RETINOPATHY: NORMAL
SEVERITY (EYE EXAM): NORMAL

## 2024-07-17 PROCEDURE — 99204 OFFICE O/P NEW MOD 45 MIN: CPT | Performed by: INTERNAL MEDICINE

## 2024-07-17 PROCEDURE — 92250 FUNDUS PHOTOGRAPHY W/I&R: CPT | Performed by: INTERNAL MEDICINE

## 2024-07-17 RX ORDER — ROSUVASTATIN CALCIUM 40 MG/1
40 TABLET, COATED ORAL
Qty: 90 TABLET | Refills: 3 | Status: SHIPPED | OUTPATIENT
Start: 2024-07-17

## 2024-07-17 RX ORDER — LEVOTHYROXINE SODIUM 88 UG/1
88 TABLET ORAL DAILY
Qty: 90 TABLET | Refills: 3 | Status: SHIPPED | OUTPATIENT
Start: 2024-07-17

## 2024-07-17 RX ORDER — AMLODIPINE AND OLMESARTAN MEDOXOMIL 5; 20 MG/1; MG/1
1 TABLET ORAL DAILY
Qty: 90 TABLET | Refills: 3 | Status: SHIPPED | OUTPATIENT
Start: 2024-07-17

## 2024-07-17 NOTE — PROGRESS NOTES
Ambulatory Visit  Name: Cachorro Gray      : 1965      MRN: [id number]  Encounter Provider: Jael Krishnamurthy DO  Encounter Date: 2024   Encounter department: [facility]    Assessment & Plan   1. Type 2 diabetes mellitus with diabetic polyneuropathy, without long-term current use of insulin (HCC)  -     [facility] Diabetic eye exam  Last few A1c in prediabetic range but pt off ozempic for over 6 months now and no labs since last Fall  Reassess with upcoming labs   Lo carb diet Increase water intake   Pt is scheduling formal eye exam [facility] today   2. Primary hypertension  -     amlodipine-olmesartan (MANAS) 5-20 MG; Take 1 tablet by mouth daily  Lo sodium diet   3. Hypothyroidism (acquired)  -     levothyroxine (Synthroid) 88 mcg tablet; Take 1 tablet (88 mcg total) by mouth daily  Stable on current rx   4. Mixed hyperlipidemia  -     rosuvastatin (CRESTOR) 40 MG tablet; Take 1 tablet (40 mg total) by mouth daily with dinner  Low fat diet Continue statin Recheck lipid   5. Diabetes mellitus due to underlying condition with hyperosmolarity without coma, without long-term current use of insulin (HCC)  6. Prediabetes  -     Albumin / creatinine urine ratio; Future; Expected date: 2024  -     Comprehensive metabolic panel; Future; Expected date: 2024  -     Hemoglobin A1C; Future; Expected date: 2024  -     Lipid Panel with Direct LDL reflex; Future; Expected date: 2024     Rto 3months   History of Present Illness     HPI  NP visit Hx of diabetes but went on ozempic and A1c prediabetic range after 40 pound weight loss He stopped due to episode of pancreatitis and side effects and has been off since last Fall No recent labs or followup He is a retired  and not very active due to knee pain He has gained some weight back off ozempic He has eye appt upcoming at [facility] No chest pain or sob He keeps busy at home but no regular exercise due to his knees No falls  Diet fair control He did stop etoh and is drinking more water No recent illness He has neuropathy of lower legs and hx of lumbar djd  Review of Systems   Constitutional:  Negative for chills and fever.   HENT: Negative.     Eyes:  Negative for visual disturbance.   Respiratory:  Negative for cough and shortness of breath.    Cardiovascular:  Negative for chest pain, palpitations and leg swelling.   Gastrointestinal: Negative.    Genitourinary: Negative.    Musculoskeletal:  Positive for arthralgias and back pain.   Skin:  Positive for color change. Negative for wound.   Neurological:  Positive for numbness. Negative for dizziness, light-headedness and headaches.   Psychiatric/Behavioral:  Negative for sleep disturbance. The patient is not nervous/anxious.      Past Medical History:   Diagnosis Date    Chronic bilateral low back pain without sciatica     Cigar smoker     Fatty liver     Hypertension     Memory change     Mixed hyperlipidemia     Non-insulin dependent type 2 diabetes mellitus (HCC)     Obstructive sleep apnea      History reviewed. No pertinent surgical history.  Social History     Socioeconomic History    Marital status: /Civil Union     Spouse name: Not on file    Number of children: Not on file    Years of education: Not on file    Highest education level: Not on file   Occupational History    Not on file   Tobacco Use    Smoking status: Some Days     Types: Cigars    Smokeless tobacco: Never   Vaping Use    Vaping status: Never Used   Substance and Sexual Activity    Alcohol use: Not Currently    Drug use: Never    Sexual activity: Yes     Partners: Female     Comment:    Other Topics Concern    Not on file   Social History Narrative    Not on file     Social Determinants of Health     Financial Resource Strain: Low Risk  (9/30/2022)    Overall Financial Resource Strain (CARDIA)     Difficulty of Paying Living Expenses: Not hard at all   Food Insecurity: No Food Insecurity  (9/30/2022)    Hunger Vital Sign     Worried About Running Out of Food in the Last Year: Never true     Ran Out of Food in the Last Year: Never true   Transportation Needs: No Transportation Needs (9/30/2022)    PRAPARE - Transportation     Lack of Transportation (Medical): No     Lack of Transportation (Non-Medical): No   Physical Activity: Inactive (9/30/2022)    Exercise Vital Sign     Days of Exercise per Week: 5 days     Minutes of Exercise per Session: 0 min   Stress: No Stress Concern Present (9/30/2022)    [employer] [place] [employer] - Occupational Stress Questionnaire     Feeling of Stress : Not at all   Social Connections: Unknown (6/18/2024)    Received from [facility]     How often do you feel lonely or isolated from those around you? (Adult - for ages 18 years and over): Not on file   Intimate Partner Violence: Not At Risk (9/30/2022)    Humiliation, Afraid, Rape, and Kick questionnaire     Fear of Current or Ex-Partner: No     Emotionally Abused: No     Physically Abused: No     Sexually Abused: No   Housing Stability: Low Risk  (9/30/2022)    Housing Stability Vital Sign     Unable to Pay for Housing in the Last Year: No     Number of Places Lived in the Last Year: 1     Unstable Housing in the Last Year: No     Allergies   Allergen Reactions    Ozempic (0.25 Or 0.5 Mg-Dose) [Semaglutide(0.25 Or 0.5mg-Dos)] Abdominal Pain     pancreatitis     Diabetic Foot Exam    Patient's shoes and socks removed.    Right Foot/Ankle   Right Foot Inspection  Skin Exam: skin normal, skin intact and dry skin. No warmth, no callus, no erythema, no maceration, no abnormal color, no pre-ulcer, no ulcer and no callus.     Toe Exam: ROM and strength within normal limits. No swelling and no tenderness    Sensory   Vibration: diminished  Proprioception: intact  Monofilament testing: diminished    Vascular  The right DP pulse is 2+. The right PT pulse is 2+.     Left Foot/Ankle  Left Foot  "Inspection  Skin Exam: skin normal, skin intact and dry skin. No warmth, no erythema, no maceration, normal color, no pre-ulcer, no ulcer and no callus.     Toe Exam: ROM and strength within normal limits. No swelling and no tenderness.     Sensory   Vibration: diminished  Proprioception: intact  Monofilament testing: diminished    Vascular  The left DP pulse is 2+. The left PT pulse is 2+.     Assign Risk Category  No deformity present  Loss of protective sensation  No weak pulses  Risk: 1    Objective     /76   Pulse 78   Temp 97.5 °F (36.4 °C) (Temporal)   Resp 18   Ht 5' 9\" (1.753 m)   Wt (!) 137 kg (303 lb)   SpO2 98%   BMI 44.75 kg/m²     Physical Exam  Vitals and nursing note reviewed.   Constitutional:       General: He is not in acute distress.     Appearance: Normal appearance. He is not ill-appearing, toxic-appearing or diaphoretic.   HENT:      Head: Normocephalic and atraumatic.      Right Ear: Tympanic membrane, ear canal and external ear normal. There is no impacted cerumen.      Left Ear: Tympanic membrane, ear canal and external ear normal. There is no impacted cerumen.      Nose: Nose normal.      Mouth/Throat:      Mouth: Mucous membranes are moist.   Eyes:      General: No scleral icterus.     Extraocular Movements: Extraocular movements intact.      Conjunctiva/sclera: Conjunctivae normal.      Pupils: Pupils are equal, round, and reactive to light.   Cardiovascular:      Rate and Rhythm: Normal rate and regular rhythm.      Pulses: Normal pulses. no weak pulses.           Dorsalis pedis pulses are 2+ on the right side and 2+ on the left side.        Posterior tibial pulses are 2+ on the right side and 2+ on the left side.      Heart sounds: Normal heart sounds.   Pulmonary:      Effort: Pulmonary effort is normal. No respiratory distress.      Breath sounds: Normal breath sounds. No wheezing.   Abdominal:      General: Bowel sounds are normal. There is no distension.      " Palpations: Abdomen is soft.      Tenderness: There is no abdominal tenderness.   Musculoskeletal:      Cervical back: Normal range of motion and neck supple.      Right lower leg: No edema.      Left lower leg: No edema.   Feet:      Right foot:      Skin integrity: Dry skin present. No ulcer, skin breakdown, erythema, warmth or callus.      Left foot:      Skin integrity: Dry skin present. No ulcer, skin breakdown, erythema, warmth or callus.   Lymphadenopathy:      Cervical: No cervical adenopathy.   Skin:     General: Skin is warm and dry.      Coloration: Skin is not jaundiced or pale.   Neurological:      General: No focal deficit present.      Mental Status: He is alert and oriented to person, place, and time. Mental status is at baseline.      Cranial Nerves: No cranial nerve deficit.      Sensory: Sensory deficit present.      Gait: Gait normal.   Psychiatric:         Mood and Affect: Mood normal.         Behavior: Behavior normal.         Thought Content: Thought content normal.         Judgment: Judgment normal.     Administrative Statements

## 2024-07-29 LAB
LEFT EYE DIABETIC RETINOPATHY: NORMAL
RIGHT EYE DIABETIC RETINOPATHY: NORMAL

## 2024-11-12 LAB
CREAT ?TM UR-SCNC: 76 UMOL/L
EXT ALBUMIN URINE RANDOM: 0.7
HBA1C MFR BLD HPLC: 6.6 %
MICROALBUMIN/CREAT UR: 9 MG/G{CREAT}

## 2024-11-20 ENCOUNTER — OFFICE VISIT (OUTPATIENT)
Dept: SLEEP CENTER | Facility: CLINIC | Age: 59
End: 2024-11-20
Payer: COMMERCIAL

## 2024-11-20 VITALS
TEMPERATURE: 98.6 F | RESPIRATION RATE: 16 BRPM | BODY MASS INDEX: 44.94 KG/M2 | WEIGHT: 303.4 LBS | DIASTOLIC BLOOD PRESSURE: 76 MMHG | HEART RATE: 67 BPM | SYSTOLIC BLOOD PRESSURE: 134 MMHG | HEIGHT: 69 IN | OXYGEN SATURATION: 95 %

## 2024-11-20 DIAGNOSIS — F51.04 CHRONIC INSOMNIA: ICD-10-CM

## 2024-11-20 DIAGNOSIS — G47.33 OSA (OBSTRUCTIVE SLEEP APNEA): Primary | ICD-10-CM

## 2024-11-20 PROBLEM — R11.0 NAUSEA: Status: RESOLVED | Noted: 2022-06-23 | Resolved: 2024-11-20

## 2024-11-20 PROBLEM — R10.32 LEFT GROIN PAIN: Status: RESOLVED | Noted: 2024-01-10 | Resolved: 2024-11-20

## 2024-11-20 PROBLEM — R68.81 EARLY SATIETY: Status: RESOLVED | Noted: 2022-06-23 | Resolved: 2024-11-20

## 2024-11-20 PROCEDURE — 99214 OFFICE O/P EST MOD 30 MIN: CPT

## 2024-11-20 NOTE — PROGRESS NOTES
[facility]  Sleep Medicine Follow Up/Established Patient    PATIENT NAME: Cachorro Gray  DATE OF SERVICE: December 17, 2024  DATE OF LAST VISIT: 1/16/2023    ASSESSMENT/PLAN:  Cachorro Gray is a 59 y.o. male with PMHx of ELIZA on CPAP, DM 2, HTN, HLD, hypothyroidism, GERD and obesity who returns to the office for follow up.    ELIZA (Obstructive Sleep Apnea)  Class 3 Obesity  -The patient has a history of sleep apnea of unknown severity and is currently on auto CPAP 5-16 cmH2O.  His device is >5 years old and he is noticing he is starting to have some resumption of prior symptoms, additionally he has not had a supply refill in some time.  As his previous sleep studies are not currently available will need to obtain a new study first.  - Will order HSAT.  - Therapy and compliance data reviewed: residual AHI 0.9, compliance 97% and mask leak is elevated, but this may be due to age of his current supplies.  - Continue with APAP 5-16 cmH2O with a nasal pillows mask while awaiting new study.  - Refill of supplies will be sent to the patient's DME  - Explained the importance of keeping the machine clean, and that they should be eligible for refills of supplies every 3-6 months depending on insurance.  We also discussed the insurance compliance requirements.  - Encouraged healthy lifestyle with adequate sleep (7-9 hours per night), healthy balanced diet and routine exercise.  Explained the importance of avoiding driving while drowsy.  Weight loss is recommended.  - Follow-up after sleep study  -     PAP DME Resupply/Reorder  -     Home Study; Future    Chronic Insomnia  - The patient's insomnia appears multifactorial with decrease in daily activity since retiring and episodes of falling asleep on his recliner before going to bed which are likely contributing.  His current CPAP is also >5 years old and he has not had recent supply refills so is possible he has residual untreated sleep apnea  which could be worsening insomnia as well.  There is also lack of stimulus control.  - Discussed the importance of sleep hygiene and stimulus control, recommend setting an alarm to go up to bed before he falls asleep on the recliner at night and to increase daily activity as tolerated.  - Plan for ELIZA as above, will reassess degree of insomnia after obtaining new CPAP and supplies for the patient.  ________________________________________________________________________________________________    Interval History: Cachorro Gray is a 59 y.o. male with PMHx of ELIZA on CPAP, DM 2, HTN, HLD, hypothyroidism, GERD and obesity who returns to the office for follow up.  He notes that he is continuing to use his CPAP and does get good benefit from it; however, states he has not had supply refills since 2021 as it appears his old sleep study was unable to be obtained.  He does have extra nasal pillow cushions at home which she has been replacing, but some of his other supplies such as tubing and filters have not been replaced in some time.  More recently he notes that he has had increased mask leak and he started having nocturia again which initially completely resolved with PAP therapy.  He also notes some difficulties with falling asleep at night and states since getting a new dog recently he has been getting up earlier than previous.  He otherwise denies symptoms of RLS, SP, HH, cataplexy and parasomnias.    Insomnia Checklist  Timing: onset  Current Medications: None  Prior Medications: ambien  Difficulty falling back to sleep: Sometimes may not fall back to sleep if he gets up around 0500, sometimes he is able to fall back to sleep easily  Anxiety/Rumination: Yes, does note racing thoughts about the next day at times    Pre-Bed Routine: fairly consistent  Awake in bed > 20 min: Yes, will be in the bed the entire time he is trying to fall asleep     In bed during the day: No   Screen use in bed: Yes, will watch TV to  avoid thinking about the next day at times, but denies other screen use    Caffeine: Yes, 1 cup of coffee in the morning, 1 cup of black tea 2-3 hours before bed (just started this recently)  Clock Watching: No    Other Information: Sometimes he is sleepy when going to bed, but other times he is not sleepy or is just fatigued.  He states this has been worse since he retired.  He sometimes will go to bed early due to the new dog waking them up early.  He also reports he has been less active during the day since retiring.  He notes that it can take up to an hour to fall asleep, but sometimes this is due to having fallen asleep on his recliner while watching TV prior to moving to bed.  He states in the distant past he has been on Ambien.    Prior History: The patient was initially diagnosed with ELIZA a number of years ago in [place] and had been on CPAP for some time.  He established with [facility] sleep medicine in 1/2023 at which time he was consistently using his CPAP with good benefit.  Plan was to try and obtain his previous sleep studies from his [place] provider and a home sleep study was ordered along with resupplies for his device and pressure change to 7-16 cmH2O.  The study was never completed and the patient was lost to follow-up, he is representing today for reevaluation.    ESS: Total score: 1/24  Greater or equal to 10 is positive for excessive daytime sleepiness  Pertinent Meds: None    Sleep-Wake Schedule:  Bedtime: 2200  Wake Time: 0600 (because of the new dog)  Difficulty Falling Asleep: Yes, it can take up to an hour to fall asleep, but he notes that he will sometimes fall asleep often on the recliner while watching TV before going up to bed for ~15 min  Avg Number of Awakenings: 1x  Cause of Awakenings: bathroom  Weekend Sleep Schedule: unchanged  Naps: denies    Avg TST per 24 hours: 6-7 hours    Past Treatments:  APAP 5-16 cmH2O    Prior Sleep Studies:  Not available    Other Relevant Labs  and Studies:  Therapy and Compliance Data -- 10/21/2024 - 11/19/2024      Past Medical History:   Diagnosis Date    Chronic bilateral low back pain without sciatica     Cigar smoker     Fatty liver     Hypertension     Memory change     Mixed hyperlipidemia     Non-insulin dependent type 2 diabetes mellitus (HCC)     Obstructive sleep apnea    No past surgical history on file.  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    Fatty liver disease, nonalcoholic    Diabetes mellitus due to underlying condition with hyperosmolarity without coma, without long-term current use of insulin (HCC)    Pneumococcal vaccination declined    Chronic cough    Gastroesophageal reflux disease    Male stress incontinence    Primary osteoarthritis of both knees    Hyperlipidemia    Annual physical exam     Allergies as of 11/20/2024 - Reviewed 11/20/2024   Allergen Reaction Noted    Ozempic (0.25 or 0.5 mg-dose) [semaglutide(0.25 or 0.5mg-dos)] Abdominal Pain 11/15/2023     REVIEW OF SYSTEMS:  Review of Systems  10-point system review completed, all of which are negative except as mentioned above.    CURRENT MEDICATIONS:  Current Outpatient Medications   Medication Instructions    amlodipine-olmesartan (MANAS) 5-20 MG 1 tablet, Oral, Daily    Cholecalciferol 2,000 Units, Oral, Daily    cyanocobalamin (VITAMIN B-12) 100 MCG tablet Take by mouth    levothyroxine (SYNTHROID) 88 mcg, Oral, Daily    Magnesium 100 MG CAPS 1 capsule, Daily    rosuvastatin (CRESTOR) 40 mg, Oral, Daily with dinner    Saw Palmetto 80 mg, Daily    tamsulosin (FLOMAX) 0.4  "mg, Oral, Daily with dinner    Zinc 10 MG LOZG Apply to the mouth or throat     SOCIAL HISTORY:  Social History     Tobacco Use    Smoking status: Some Days     Types: Cigars    Smokeless tobacco: Never   Vaping Use    Vaping status: Never Used   Substance Use Topics    Alcohol use: Not Currently    Drug use: Never     FAMILY HISTORY:  Family History   Problem Relation Age of Onset    Stroke Mother     Heart disease Father      PHYSICAL EXAMINATION:  Vital Signs:  /76 (BP Location: Left arm, Patient Position: Sitting, Cuff Size: Large)   Pulse 67   Temp 98.6 °F (37 °C) (Temporal)   Resp 16   Ht 5' 9\" (1.753 m)   Wt (!) 138 kg (303 lb 6.4 oz)   SpO2 95%   BMI 44.80 kg/m²   Body mass index is 44.8 kg/m².  Constitutional: NAD, well appearing, obese   Mental Status: AAOx3  Skin: Warm, dry, no rashes noted   Eyes: PERRL, normal conjunctiva  ENT: Nasal congestion absent, nasal valve incompetence absent.  Chest: RRR, +S1/S2, CTA B/L, no W/R/R, no M/R/G   Extremities: No digital clubbing or pedal edema      Melissa Willoughby MD  Pulmonary-Critical Care and Sleep Medicine  12/17/24    Portions of the record may have been created with voice recognition software. Occasional wrong word or \"sound a like\" substitutions may have occurred due to the inherent limitations of voice recognition software. Please read the chart carefully and recognize, using context, where substitutions have occurred.  "

## 2024-11-20 NOTE — PATIENT INSTRUCTIONS
- An at home sleep study has been ordered to evaluate for sleep apnea.  This test should be scheduled at your earliest convenience.  - After the home study we will order you a new CPAP

## 2024-11-22 ENCOUNTER — OFFICE VISIT (OUTPATIENT)
Dept: FAMILY MEDICINE CLINIC | Facility: CLINIC | Age: 59
End: 2024-11-22
Payer: COMMERCIAL

## 2024-11-22 ENCOUNTER — TELEPHONE (OUTPATIENT)
Dept: SLEEP CENTER | Facility: CLINIC | Age: 59
End: 2024-11-22

## 2024-11-22 VITALS
HEIGHT: 69 IN | WEIGHT: 307.4 LBS | OXYGEN SATURATION: 94 % | TEMPERATURE: 97.7 F | HEART RATE: 73 BPM | BODY MASS INDEX: 45.53 KG/M2

## 2024-11-22 DIAGNOSIS — I10 PRIMARY HYPERTENSION: ICD-10-CM

## 2024-11-22 DIAGNOSIS — N40.1 BENIGN PROSTATIC HYPERPLASIA WITH NOCTURIA: ICD-10-CM

## 2024-11-22 DIAGNOSIS — N20.0 CALCULUS OF KIDNEY: ICD-10-CM

## 2024-11-22 DIAGNOSIS — Z00.00 ANNUAL PHYSICAL EXAM: Primary | ICD-10-CM

## 2024-11-22 DIAGNOSIS — R10.84 GENERALIZED ABDOMINAL PAIN: ICD-10-CM

## 2024-11-22 DIAGNOSIS — R35.1 BENIGN PROSTATIC HYPERPLASIA WITH NOCTURIA: ICD-10-CM

## 2024-11-22 DIAGNOSIS — E03.9 HYPOTHYROIDISM (ACQUIRED): ICD-10-CM

## 2024-11-22 DIAGNOSIS — E78.2 MIXED HYPERLIPIDEMIA: ICD-10-CM

## 2024-11-22 PROCEDURE — 99396 PREV VISIT EST AGE 40-64: CPT | Performed by: INTERNAL MEDICINE

## 2024-11-22 RX ORDER — TAMSULOSIN HYDROCHLORIDE 0.4 MG/1
0.4 CAPSULE ORAL
Qty: 90 CAPSULE | Refills: 3 | Status: SHIPPED | OUTPATIENT
Start: 2024-11-22

## 2024-11-22 RX ORDER — ROSUVASTATIN CALCIUM 40 MG/1
40 TABLET, COATED ORAL
Qty: 90 TABLET | Refills: 3 | Status: SHIPPED | OUTPATIENT
Start: 2024-11-22

## 2024-11-22 RX ORDER — AMLODIPINE AND OLMESARTAN MEDOXOMIL 5; 20 MG/1; MG/1
1 TABLET ORAL DAILY
Qty: 90 TABLET | Refills: 3 | Status: SHIPPED | OUTPATIENT
Start: 2024-11-22

## 2024-11-22 RX ORDER — LEVOTHYROXINE SODIUM 88 UG/1
88 TABLET ORAL DAILY
Qty: 90 TABLET | Refills: 3 | Status: SHIPPED | OUTPATIENT
Start: 2024-11-22

## 2024-11-22 NOTE — ASSESSMENT & PLAN NOTE
Orders:    levothyroxine (Synthroid) 88 mcg tablet; Take 1 tablet (88 mcg total) by mouth daily  Stable on current rx

## 2024-11-22 NOTE — PROGRESS NOTES
Name: Cachorro Gray      : 1965      MRN: [id number]  Encounter Provider: Jael Krishnamurthy DO  Encounter Date: 2024   Encounter department: [facility]  :  Assessment & Plan  Primary hypertension    Orders:  •  amlodipine-olmesartan (MANAS) 5-20 MG; Take 1 tablet by mouth daily  BP stable Lo sodium diet Continue current rx   Hypothyroidism (acquired)    Orders:  •  levothyroxine (Synthroid) 88 mcg tablet; Take 1 tablet (88 mcg total) by mouth daily  Stable on current rx   Mixed hyperlipidemia    Orders:  •  rosuvastatin (CRESTOR) 40 MG tablet; Take 1 tablet (40 mg total) by mouth daily with dinner  Low fat diet Continue statin   Calculus of kidney    Orders:  •  tamsulosin (FLOMAX) 0.4 mg; Take 1 capsule (0.4 mg total) by mouth daily with dinner    Benign prostatic hyperplasia with nocturia    Orders:  •  tamsulosin (FLOMAX) 0.4 mg; Take 1 capsule (0.4 mg total) by mouth daily with dinner    Generalized abdominal pain    Orders:  •  Amylase; Future  •  Lipase; Future  Prior hx of pancreatitis on diabetic rx Recheck labs Lo fat diet   Annual physical exam  Pt has sleep study upcoming and would benefit from updated equipment  Stay hydrated   Retry sleepytime tea or melatonin until sleep study completed   Improve food choices to lower A1c and stay off medication        Rto 3months with labs       Depression Screening and Follow-up Plan: Patient was screened for depression during today's encounter. They screened negative with a PHQ-2 score of 0.    Tobacco Cessation Counseling: Tobacco cessation counseling was provided. The patient is sincerely urged to quit consumption of tobacco. He is not ready to quit tobacco.     History of Present Illness     HPI  Pt doing ok He has some intermittent abdominal pain and concerned since he had pancreatitis in past No n/v He is aware A1c up slightly and not monitoring BS often Diet fair control He would like to remain off medication for diabetes  "  Review of Systems   Constitutional:  Negative for chills and fever.   HENT: Negative.     Eyes:  Negative for visual disturbance.   Respiratory:  Negative for cough and shortness of breath.    Cardiovascular:  Negative for chest pain, palpitations and leg swelling.   Gastrointestinal:  Positive for abdominal pain. Negative for blood in stool, constipation and diarrhea.   Genitourinary: Negative.    Musculoskeletal:  Positive for arthralgias.   Neurological:  Negative for dizziness, light-headedness and headaches.   Psychiatric/Behavioral:  Positive for sleep disturbance. The patient is not nervous/anxious.           Objective   Pulse 73   Temp 97.7 °F (36.5 °C) (Temporal)   Ht 5' 9\" (1.753 m)   Wt (!) 139 kg (307 lb 6.4 oz)   SpO2 94%   BMI 45.40 kg/m²      Physical Exam  Vitals and nursing note reviewed.   Constitutional:       General: He is not in acute distress.     Appearance: Normal appearance. He is not ill-appearing, toxic-appearing or diaphoretic.   HENT:      Head: Normocephalic and atraumatic.      Right Ear: External ear normal.      Left Ear: External ear normal.      Nose: Nose normal.      Mouth/Throat:      Mouth: Mucous membranes are moist.   Eyes:      General: No scleral icterus.     Extraocular Movements: Extraocular movements intact.      Conjunctiva/sclera: Conjunctivae normal.      Pupils: Pupils are equal, round, and reactive to light.   Cardiovascular:      Rate and Rhythm: Normal rate and regular rhythm.      Pulses: Normal pulses.      Heart sounds: Normal heart sounds.   Pulmonary:      Effort: Pulmonary effort is normal. No respiratory distress.      Breath sounds: Normal breath sounds. No wheezing.   Abdominal:      General: Bowel sounds are normal. There is no distension.      Palpations: Abdomen is soft.      Tenderness: There is no abdominal tenderness.   Musculoskeletal:      Cervical back: Normal range of motion and neck supple.      Right lower leg: No edema.      Left " lower leg: No edema.   Lymphadenopathy:      Cervical: No cervical adenopathy.   Skin:     General: Skin is warm and dry.      Coloration: Skin is not jaundiced or pale.   Neurological:      General: No focal deficit present.      Mental Status: He is alert and oriented to person, place, and time. Mental status is at baseline.   Psychiatric:         Mood and Affect: Mood normal.         Behavior: Behavior normal.         Thought Content: Thought content normal.         Judgment: Judgment normal.

## 2024-11-22 NOTE — ASSESSMENT & PLAN NOTE
Pt has sleep study upcoming and would benefit from updated equipment  Stay hydrated   Retry sleepytime tea or melatonin until sleep study completed   Improve food choices to lower A1c and stay off medication

## 2024-11-22 NOTE — ASSESSMENT & PLAN NOTE
Orders:    amlodipine-olmesartan (MANAS) 5-20 MG; Take 1 tablet by mouth daily  BP stable Lo sodium diet Continue current rx

## 2024-11-27 DIAGNOSIS — E03.9 HYPOTHYROIDISM, UNSPECIFIED TYPE: Primary | ICD-10-CM

## 2024-12-10 ENCOUNTER — HOSPITAL ENCOUNTER (OUTPATIENT)
Dept: SLEEP CENTER | Facility: HOSPITAL | Age: 59
Discharge: HOME | End: 2024-12-10
Payer: COMMERCIAL

## 2024-12-10 DIAGNOSIS — G47.33 OSA (OBSTRUCTIVE SLEEP APNEA): ICD-10-CM

## 2024-12-10 PROCEDURE — G0399 HOME SLEEP TEST/TYPE 3 PORTA: HCPCS

## 2024-12-10 NOTE — PROGRESS NOTES
Home Sleep Study Documentation    HOME STUDY DEVICE: Noxturnal no                                           Yesica G3 yes device # 24      Pre-Sleep Home Study:    Set-up and instructions performed by: Becyk    Technician performed demonstration for Patient: yes    Return demonstration performed by Patient: yes    Written instructions provided to Patient: yes    Patient signed consent form: yes        Post-Sleep Home Study:    Additional comments by Patient: None    Home Sleep Study Failed:no:    Failure reason: N/A    Reported or Detected: N/A    Scored by: KULWANT Baum

## 2024-12-17 ENCOUNTER — TELEPHONE (OUTPATIENT)
Dept: SLEEP CENTER | Facility: CLINIC | Age: 59
End: 2024-12-17

## 2024-12-17 ENCOUNTER — RESULTS FOLLOW-UP (OUTPATIENT)
Dept: SLEEP CENTER | Facility: CLINIC | Age: 59
End: 2024-12-17

## 2024-12-17 DIAGNOSIS — G47.33 OSA (OBSTRUCTIVE SLEEP APNEA): Primary | ICD-10-CM

## 2024-12-17 PROCEDURE — 95806 SLEEP STUDY UNATT&RESP EFFT: CPT

## 2024-12-17 NOTE — TELEPHONE ENCOUNTER
Home sleep study resulted and shows severe sleep apnea, with moderate respiratory event related hypoxia. Intermittent tachycardia noted.  CHRISTIANO 74.5. Replacement CPAP ordered.     Called patient and left message advising I will send a [employer] message with the sleep study results and next steps.  Provided sleep center number([phone]) to call if any questions.     CPAP script and other clinicals sent to [facility] via [employer].    Added to teams DME set up chat.

## 2024-12-17 NOTE — TELEPHONE ENCOUNTER
Return call from patient who states he will be using [facility] in [place] as DME provider. FAX: [phone].     Reviewed results with patient. Discussed change compliance appointment to 2/27/2025 @ 10:20 am, patient agreeable.     Updated teams DME set up chat note with new DME provider.

## 2024-12-20 NOTE — TELEPHONE ENCOUNTER
Pt calling advise his sleep study results were sent along with the script for supplies and cpap.      Please send to [facility]. FAX: [phone].      Pt is asking for a call when its been submitted. Thank you.

## 2024-12-24 NOTE — TELEPHONE ENCOUNTER
Received call from patient who states [facility] has not received a copy of his sleep study.  Please fax to [phone].    Advised patient that CPAP Rx and all clinical documents were faxed to that number on 12/17/24 but I will fax everything again now.      Rx for CPAP and all clinical documents including home sleep study faxed to [facility] as requested.

## 2025-01-14 LAB

## 2025-02-11 LAB — HBA1C MFR BLD HPLC: 7.2 %

## 2025-02-21 ENCOUNTER — OFFICE VISIT (OUTPATIENT)
Dept: FAMILY MEDICINE CLINIC | Facility: CLINIC | Age: 60
End: 2025-02-21
Payer: COMMERCIAL

## 2025-02-21 VITALS
SYSTOLIC BLOOD PRESSURE: 134 MMHG | OXYGEN SATURATION: 94 % | BODY MASS INDEX: 45.83 KG/M2 | DIASTOLIC BLOOD PRESSURE: 80 MMHG | HEIGHT: 69 IN | HEART RATE: 71 BPM | TEMPERATURE: 97.8 F | WEIGHT: 309.4 LBS | RESPIRATION RATE: 18 BRPM

## 2025-02-21 DIAGNOSIS — E08.00 DIABETES MELLITUS DUE TO UNDERLYING CONDITION WITH HYPEROSMOLARITY WITHOUT COMA, WITHOUT LONG-TERM CURRENT USE OF INSULIN (HCC): ICD-10-CM

## 2025-02-21 DIAGNOSIS — R79.89 LOW TESTOSTERONE: Primary | ICD-10-CM

## 2025-02-21 DIAGNOSIS — E03.9 HYPOTHYROIDISM, UNSPECIFIED TYPE: ICD-10-CM

## 2025-02-21 PROBLEM — R05.3 CHRONIC COUGH: Status: RESOLVED | Noted: 2023-11-15 | Resolved: 2025-02-21

## 2025-02-21 PROBLEM — Z28.21 PNEUMOCOCCAL VACCINATION DECLINED: Status: RESOLVED | Noted: 2023-08-15 | Resolved: 2025-02-21

## 2025-02-21 PROCEDURE — 99214 OFFICE O/P EST MOD 30 MIN: CPT | Performed by: INTERNAL MEDICINE

## 2025-02-21 NOTE — PROGRESS NOTES
Name: Cachorro Gray      : 1965      MRN: [id number]  Encounter Provider: Jael Krishnamurthy DO  Encounter Date: 2025   Encounter department: [facility]  :  Assessment & Plan  Diabetes mellitus due to underlying condition with hyperosmolarity without coma, without long-term current use of insulin (HCC)    Lab Results   Component Value Date    HGBA1C 7.2 2025   Discussed idte changes and increase exercsie/activity Recheck A1c in 3 months and if not below 7 will need to resume rx     Orders:  •  Albumin / creatinine urine ratio; Future  •  Comprehensive metabolic panel; Future  •  Hemoglobin A1C; Future    Low testosterone    Orders:  •  Ambulatory Referral to Urology; Future  Pt has seen Dr Damico in past for kidney stones and referral sent for eval for lo testosterone level   Hypothyroidism, unspecified type    Orders:  •  TSH, 3rd generation with Free T4 reflex; Future  Recheck TSH Pt stopped medication in recent weeks         Depression Screening and Follow-up Plan: Patient was screened for depression during today's encounter. They screened negative with a PHQ-2 score of 0.      Tobacco Cessation Counseling: Tobacco cessation counseling was provided. The patient is sincerely urged to quit consumption of tobacco. He is not ready to quit tobacco.   Rto 3months   History of Present Illness   HPI  Pt doing ok He stopped his thyroid medicine because he was afraid of side effect of bone loss He had what sounds like the flu recently but is recovered He is aware BS average higher Weight is up from last time Not as active during the winter months   Review of Systems   Constitutional:  Positive for fatigue. Negative for chills and fever.   HENT: Negative.     Eyes:  Negative for visual disturbance.   Respiratory:  Negative for cough and shortness of breath.    Cardiovascular: Negative.    Gastrointestinal: Negative.    Genitourinary: Negative.    Musculoskeletal: Negative.     Neurological:  Positive for headaches.   Psychiatric/Behavioral: Negative.       Past Medical History:   Diagnosis Date   • Chronic bilateral low back pain without sciatica    • Cigar smoker    • Fatty liver    • Hypertension    • Memory change    • Mixed hyperlipidemia    • Non-insulin dependent type 2 diabetes mellitus (HCC)    • Obstructive sleep apnea      History reviewed. No pertinent surgical history.  Social History     Socioeconomic History   • Marital status: /Civil Union     Spouse name: Not on file   • Number of children: Not on file   • Years of education: Not on file   • Highest education level: Not on file   Occupational History   • Not on file   Tobacco Use   • Smoking status: Some Days     Types: Cigars   • Smokeless tobacco: Never   Vaping Use   • Vaping status: Never Used   Substance and Sexual Activity   • Alcohol use: Not Currently   • Drug use: Never   • Sexual activity: Yes     Partners: Female     Comment:    Other Topics Concern   • Not on file   Social History Narrative   • Not on file     Social Drivers of Health     Financial Resource Strain: Low Risk  (9/30/2022)    Overall Financial Resource Strain (CARDIA)    • Difficulty of Paying Living Expenses: Not hard at all   Food Insecurity: No Food Insecurity (9/30/2022)    Hunger Vital Sign    • Worried About Running Out of Food in the Last Year: Never true    • Ran Out of Food in the Last Year: Never true   Transportation Needs: No Transportation Needs (9/30/2022)    PRAPARE - Transportation    • Lack of Transportation (Medical): No    • Lack of Transportation (Non-Medical): No   Physical Activity: Inactive (9/30/2022)    Exercise Vital Sign    • Days of Exercise per Week: 5 days    • Minutes of Exercise per Session: 0 min   Stress: No Stress Concern Present (9/30/2022)    [employer] [place] [employer] - Occupational Stress Questionnaire    • Feeling of Stress : Not at all   Social Connections: Unknown (6/18/2024)  "   Received from Known    • How often do you feel lonely or isolated from those around you? (Adult - for ages 18 years and over): Not on file   Intimate Partner Violence: Not At Risk (9/30/2022)    Humiliation, Afraid, Rape, and Kick questionnaire    • Fear of Current or Ex-Partner: No    • Emotionally Abused: No    • Physically Abused: No    • Sexually Abused: No   Housing Stability: Low Risk  (9/30/2022)    Housing Stability Vital Sign    • Unable to Pay for Housing in the Last Year: No    • Number of Places Lived in the Last Year: 1    • Unstable Housing in the Last Year: No     Allergies   Allergen Reactions   • Ozempic (0.25 Or 0.5 Mg-Dose) [Semaglutide(0.25 Or 0.5mg-Dos)] Abdominal Pain     pancreatitis       Objective   /80   Pulse 71   Temp 97.8 °F (36.6 °C) (Temporal)   Resp 18   Ht 5' 9\" (1.753 m)   Wt (!) 140 kg (309 lb 6.4 oz)   SpO2 94%   BMI 45.69 kg/m²      Physical Exam  Vitals and nursing note reviewed.   Constitutional:       General: He is not in acute distress.     Appearance: Normal appearance. He is not ill-appearing or diaphoretic.   HENT:      Head: Normocephalic and atraumatic.      Right Ear: External ear normal.      Left Ear: External ear normal.      Nose: Nose normal.      Mouth/Throat:      Mouth: Mucous membranes are moist.   Eyes:      General: No scleral icterus.     Extraocular Movements: Extraocular movements intact.      Pupils: Pupils are equal, round, and reactive to light.   Cardiovascular:      Rate and Rhythm: Normal rate and regular rhythm.      Pulses: Normal pulses.   Pulmonary:      Effort: Pulmonary effort is normal. No respiratory distress.      Breath sounds: Normal breath sounds. No wheezing.   Abdominal:      General: Bowel sounds are normal. There is no distension.      Palpations: Abdomen is soft.      Tenderness: There is no abdominal tenderness.   Musculoskeletal:      Cervical back: Normal range of motion and neck supple. "      Right lower leg: No edema.      Left lower leg: No edema.   Lymphadenopathy:      Cervical: No cervical adenopathy.   Skin:     General: Skin is warm and dry.      Coloration: Skin is not jaundiced or pale.   Neurological:      General: No focal deficit present.      Mental Status: He is alert and oriented to person, place, and time. Mental status is at baseline.      Cranial Nerves: No cranial nerve deficit.   Psychiatric:         Mood and Affect: Mood normal.         Behavior: Behavior normal.         Thought Content: Thought content normal.         Judgment: Judgment normal.

## 2025-02-21 NOTE — ASSESSMENT & PLAN NOTE
Lab Results   Component Value Date    HGBA1C 7.2 02/11/2025   Discussed idte changes and increase exercsie/activity Recheck A1c in 3 months and if not below 7 will need to resume rx     Orders:    Albumin / creatinine urine ratio; Future    Comprehensive metabolic panel; Future    Hemoglobin A1C; Future

## 2025-02-26 ENCOUNTER — TELEPHONE (OUTPATIENT)
Age: 60
End: 2025-02-26

## 2025-02-26 NOTE — TELEPHONE ENCOUNTER
Pt under care of:  D'Amico   Office Location: [facility]      Last Seen (include Follow Up recommendations of last visit- see Office Visit - Instructions):     Pt calling due to: Ref for Low Testosterone       Pt can be reached at: [phone]     Appointment Details  Date:  4/1/25   Time: 1:20 pm       Location: [facility]      Provider: Sona       Does the appointment need further review? (Reason)

## 2025-02-27 ENCOUNTER — OFFICE VISIT (OUTPATIENT)
Dept: SLEEP CENTER | Facility: CLINIC | Age: 60
End: 2025-02-27
Payer: COMMERCIAL

## 2025-02-27 VITALS
SYSTOLIC BLOOD PRESSURE: 128 MMHG | DIASTOLIC BLOOD PRESSURE: 83 MMHG | OXYGEN SATURATION: 98 % | HEART RATE: 63 BPM | HEIGHT: 69 IN | WEIGHT: 310 LBS | BODY MASS INDEX: 45.91 KG/M2

## 2025-02-27 DIAGNOSIS — E66.813 CLASS 3 OBESITY: ICD-10-CM

## 2025-02-27 DIAGNOSIS — G47.33 OSA (OBSTRUCTIVE SLEEP APNEA): Primary | ICD-10-CM

## 2025-02-27 PROCEDURE — 99213 OFFICE O/P EST LOW 20 MIN: CPT

## 2025-02-27 NOTE — PROGRESS NOTES
[facility]  Sleep Medicine Follow Up/Established Patient    PATIENT NAME: Cachorro Gray  DATE OF SERVICE: February 27, 2025  DATE OF LAST VISIT: 11/20/2024    ASSESSMENT/PLAN:  Cachorro Gray is a 59 y.o. male with PMHx of ELIZA on CPAP, DM 2, HTN, HLD, hypothyroidism, GERD and obesity who returns to the office for follow up.    ELIZA (Obstructive Sleep Apnea)  - The patient has a history of severe ELIZA most recently studied on HSAT in 2024 (CHRISTIANO 74.5, supine CHRISTIANO 85.3, O2 bola 81%, TRT <90% 27.1 min) and is currently prescribed APAP 7-16 cmH2O.  He is doing well on his new CPAP and has no significant issues with his sleep at this time.  - Therapy and compliance data reviewed: residual AHI 0.9, compliance 100% and mask leak is mildly elevated.  - Continue with APAP 7-16 cmH2O with a nasal pillows mask.  - Refill of supplies will be sent to the patient's DME.  - Explained the importance of keeping the machine clean, and that they should be eligible for refills of supplies every 3-6 months depending on insurance.  We also discussed the insurance compliance requirements.  - Encouraged healthy lifestyle with adequate sleep (7-9 hours per night), healthy balanced diet and routine exercise.  Explained the importance of avoiding driving while drowsy.  - Follow-up in 6 months  -     PAP DME Resupply/Reorder    Class 3 obesity  - Weight loss is recommended, he has previously had multiple GI symptoms/pancreatitis with a GLP-1.  ________________________________________________________________________________________________    Interval History: Cachorro Gray is a 59 y.o. male with PMHx of ELIZA on CPAP, DM2, HTN, HLD, hypothyroidism, GERD and obesity who returns to the office for follow up.  The patient reports he has been doing well since getting his new CPAP.  His only issue with the device is that the water chamber is smaller than on his own device and he needs to refill it more  frequently.  He states otherwise he is getting good benefit from using the device and is not having any difficulties falling asleep at this time.  He does have 2 episodes of waking up to use the restroom at night and states he is on Flomax and following with urology.  He will occasionally open his mouth when using the CPAP, but was unable to tolerate a chinstrap and had claustrophobia with a FFM.  He otherwise denies aerophagia, pressure intolerance, poor mask fit, mask leak, rainout or excessive dry mouth.    PAP History  Sleep Apnea Type: ELIZA  Most Recent CHRISTIANO: 74.5 in 2024  Treatment: APAP    DME: [facility]    Current PAP Settings: 7-16 cmH2O  Compliance Data: 100%, see below    Mask Type: nasal pillows  PAP Issues: water runs out quicker  Uses Chin Strap: No, couldn't tolerate  Uses Ramp Function: Yes   Uses Humidity: Yes     There is a perceived benefit by the patient: feels more rested with PAP therapy  Observers report no longer has snoring with APAP use.    Prior History: The patient was initially diagnosed with ELIZA a number of years ago in [place] and had been on CPAP for some time.  He established with [facility] sleep medicine in 1/2023 at which time he was consistently using his CPAP with good benefit.  Plan was to try and obtain his previous sleep studies from his [place] provider and a home sleep study was ordered along with resupplies for his device and pressure change to 7-16 cmH2O.  The study was never completed and the patient was lost to follow-up.  He represented in 11/2024 at which time he was still using his old CPAP, but had not had supply refills since 2021 and his old sleep study was unable to be obtained.  He was also noting difficulty falling asleep with occasional terminal insomnia as well.  Recommendation was for HSAT and behavioral modification for insomnia.  Testing showed severe ELIZA (CHRISTIANO 74.5, supine CHRISTIANO 85.3, O2 bola 81%, TRT <90% 27.1 min) and he was represcribed  auto CPAP 7-16 cmH2O.  He is returning today for initial compliance follow-up.    ESS: Total score: 0/24  Greater or equal to 10 is positive for excessive daytime sleepiness  Pertinent Meds: None    Sleep-Wake Schedule:  Bedtime: 2300  Wake Time: 0500  Difficulty Falling Asleep: No  Avg Number of Awakenings: 1-3x  Cause of Awakenings: bathroom  Weekend Sleep Schedule: unchanged  Naps: rare, afternoon nap, <1x a week    Avg TST per 24 hours: 6-7 hours    Past Treatments:  APAP 5-16, 7-16 cmH2O    Prior Sleep Studies:  HSAT -- 12/10/2024 (Wt 296.0 lbs)  CHRISTIANO - 74.5  Sup CHRISTIANO - 85.3  O2 Renan - 81.0%  TRT < 90% - 27.1 min    Other Relevant Labs and Studies:  Therapy and Compliance Data -- 1/28/2025 - 2/26/2025      Past Medical History:   Diagnosis Date    Chronic bilateral low back pain without sciatica     Cigar smoker     Fatty liver     Hypertension     Memory change     Mixed hyperlipidemia     Non-insulin dependent type 2 diabetes mellitus (HCC)     Obstructive sleep apnea    History reviewed. No pertinent surgical history.  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    CURRENT MEDICATIONS:  Current Outpatient Medications   Medication Instructions    amlodipine-olmesartan (MANAS) 5-20 MG 1 tablet, Oral, Daily    Cholecalciferol 2,000 Units, Oral, Daily    ELDERBERRY PO Daily    Magnesium 100 MG CAPS 1 capsule, Daily    rosuvastatin (CRESTOR) 40 mg, Oral, Daily with dinner    tamsulosin (FLOMAX) 0.4 mg, Oral, Daily with dinner    Zinc 10 MG LOZG Apply to the mouth or throat     SOCIAL HISTORY:  Social History     Tobacco Use    Smoking status: Some Days     Types: Cigars    Smokeless tobacco: Never   Vaping Use    Vaping status: Never Used   Substance Use Topics    Alcohol use: Not Currently    Drug use: Never     FAMILY HISTORY:  Family History   Problem Relation Age of Onset    Stroke Mother     Heart disease Father      PHYSICAL EXAMINATION:  Vital Signs:  /83 (BP Location: Left arm, Patient Position: Sitting, Cuff Size: Large)   Pulse 63   Ht 5' 9\" (1.753 m)   Wt (!) 141 kg (310 lb)   SpO2 98%   BMI 45.78 kg/m²   Body mass index is 45.78 kg/m².  Constitutional: NAD, well appearing, obese   Mental Status: AAOx3  Skin: Warm, dry, no rashes noted  Eyes: PERRL, normal conjunctiva  ENT: Nasal congestion absent, nasal valve incompetence absent.  Chest: RRR, +S1/S2, CTA B/L, no W/R/R, no M/R/G   Extremities: No digital clubbing or pedal edema      Melissa Willoughby MD  Pulmonary-Critical Care and Sleep Medicine  02/27/25    Portions of the record may have been created with voice recognition software. Occasional wrong word or \"sound a like\" substitutions may have occurred due to the inherent limitations of voice recognition software. Please read the chart carefully and recognize, using context, where substitutions have occurred.   "

## 2025-02-27 NOTE — PATIENT INSTRUCTIONS
Continue PAP Therapy  - Continue APAP at 7-16 cmH2O.  Remember to clean your mask and equipment regularly, as directed.  You should be eligible for new supplies approximately every 3-6 months, depending on your insurance coverage. Contact your Durable Medical Equipment (DME) company for new supplies as needed.  Follow up in 6 months    Care and Maintenance  Headgear should be washed as needed. Daily inspection and weekly washings are recommended. Do not disassemble the straps. Machine wash in warm water, making sure to attach Velcro hooks and tabs before washing. Line dry or machine dry on a low setting.  Masks should be washed every day. Daily inspection is recommended. Leave the mask and tubing attached. Gently wash the mask with a soft cloth using warm water and mild detergent, concentrating on the mask cushion flaps. DO NOT use alcohol or bleach. Rinse thoroughly and air dry.  Tubing and headgear should be washed weekly. Daily inspection is recommended. Wash in warm water and mild detergent and rinse thoroughly. Hook the tubing to the machine and blow until dry.  Humidifier should be washed daily and filled with DISTILLED water before use. Wash with warm water and mild detergent. Disinfect weekly by soaking with a solution of 1 part white vinegar and 3 parts water for 30 minutes. Rinse thoroughly and air dry.  Disposable filters should be replaced once a month. Wash reusable foam filters with warm water and mild detergent at least once a month. Rinse thoroughly and dry with paper towels.  Avoid  that contain fragrance or conditioners, as these will leave a residue.  NEVER iron any soft goods.    Insurance Requirements  Your insurance requires a face-to-face follow up visit within a 31-90 day period after starting PAP therapy.  Your insurance requires compliance with your PAP device, which is at least 4 hours per night for 70% of the time. This must be done over a 30 day period and must occur within the  initial 31-90 day period after starting CPAP.  Your insurance also requires at least yearly follow ups to continue to pay for CPAP supplies.     PAP Supply Guidelines  Below are the guidelines for reordering your supplies. You will be responsible for your deductible, co payments, and out of pocket expenses.    Item Frequency   Nasal Mask (no headgear) 1 every 3 months   Nasal Mask Cushion 1 every 2 weeks   Full Face Mask (no headgear) 1 every 3 months   Full Face Mask Cushion 1 every month   Nasal Pillows 1 every 2 weeks   Headgear 1 every 6 months   hin Strap 1 every 6 months   judson 1 every 3 months   Filters: Reusable 1 every 6 months   Filters: Disposable 1 every 2 weeks   Humidifier Chamber(disposable) 1 every 6 months

## 2025-03-14 ENCOUNTER — TELEPHONE (OUTPATIENT)
Dept: SLEEP CENTER | Facility: CLINIC | Age: 60
End: 2025-03-14

## 2025-03-14 DIAGNOSIS — G47.33 OSA (OBSTRUCTIVE SLEEP APNEA): Primary | ICD-10-CM

## 2025-03-14 NOTE — TELEPHONE ENCOUNTER
Patient called to request order for new sleep mask be sent to supplier    He would like the [place]ren[place] FX mask and medium pillows sent to  [facility] ([facility])  Phone [phone] fax [phone]  [address] [facility] [address]

## 2025-03-20 NOTE — TELEPHONE ENCOUNTER
Received via [place]:  Vinicius Reeder  3/20 ? 2:31PM  Thank you for the referral! Patient is currently being serviced by the [facility]. They can be reached at [phone] if you have any further questions. Thank you!

## 2025-03-21 LAB

## 2025-03-23 PROBLEM — N40.0 BPH (BENIGN PROSTATIC HYPERPLASIA): Status: ACTIVE | Noted: 2025-03-23

## 2025-03-24 ENCOUNTER — TELEPHONE (OUTPATIENT)
Dept: UROLOGY | Facility: CLINIC | Age: 60
End: 2025-03-24

## 2025-03-24 DIAGNOSIS — N40.1 BENIGN PROSTATIC HYPERPLASIA WITH NOCTURIA: Primary | ICD-10-CM

## 2025-03-24 DIAGNOSIS — R35.1 BENIGN PROSTATIC HYPERPLASIA WITH NOCTURIA: Primary | ICD-10-CM

## 2025-03-24 NOTE — TELEPHONE ENCOUNTER
Left voicemail for patient to call office. If patient call back please let him know there is an order in his chart to get a PSA lab drawn prior to his visit on 04/01/2025 with urology.

## 2025-03-26 ENCOUNTER — APPOINTMENT (OUTPATIENT)
Dept: LAB | Facility: MEDICAL CENTER | Age: 60
End: 2025-03-26
Payer: COMMERCIAL

## 2025-03-26 DIAGNOSIS — R35.1 BENIGN PROSTATIC HYPERPLASIA WITH NOCTURIA: ICD-10-CM

## 2025-03-26 DIAGNOSIS — N40.1 BENIGN PROSTATIC HYPERPLASIA WITH NOCTURIA: ICD-10-CM

## 2025-03-26 LAB — PSA SERPL-MCNC: 0.7 NG/ML (ref 0–4)

## 2025-03-26 PROCEDURE — 84153 ASSAY OF PSA TOTAL: CPT

## 2025-03-26 PROCEDURE — 36415 COLL VENOUS BLD VENIPUNCTURE: CPT

## 2025-04-01 NOTE — TELEPHONE ENCOUNTER
Patient called in today regarding his 1:20 PM appointment, the patient states he had his dates mixed up and wasn't aware of today being April 1st. He apologizes and has set up another appointment for 4/4 with Sona. Please review if this is an appropriate appointment with time frame.     Pt cb: [phone]

## 2025-04-03 PROBLEM — N40.1 BPH ASSOCIATED WITH NOCTURIA: Status: ACTIVE | Noted: 2025-04-03

## 2025-04-03 PROBLEM — N28.1 RENAL CYST: Status: ACTIVE | Noted: 2025-04-03

## 2025-04-03 PROBLEM — R35.1 BPH ASSOCIATED WITH NOCTURIA: Status: ACTIVE | Noted: 2025-04-03

## 2025-04-03 NOTE — ASSESSMENT & PLAN NOTE
CT 1/2024-Similar 1.5 cm left lower pole cortical cyst and nonobstructing stones, largest 0.4 cm.  US kidneys bladder as above    Orders:    US kidney and bladder; Future

## 2025-04-03 NOTE — ASSESSMENT & PLAN NOTE
Ct January 2024-- nonobstructing stones left kidney, largest 0.4 cm.  US kidneys bladder   Orders:    US kidney and bladder; Future

## 2025-04-03 NOTE — PROGRESS NOTES
Name: Cachorro Gray      : 1965      MRN: [id number]  Encounter Provider: WHIT Levi  Encounter Date: 2025   Encounter department: [facility] [place]  :  Assessment & Plan  Renal stones  Ct 2024-- nonobstructing stones left kidney, largest 0.4 cm.  US kidneys bladder   Orders:    US kidney and bladder; Future    Renal cyst  CT 2024-Similar 1.5 cm left lower pole cortical cyst and nonobstructing stones, largest 0.4 cm.  US kidneys bladder as above    Orders:    US kidney and bladder; Future    BPH associated with nocturia  Previously managed on flomax  BOB refused  Continue flomax                Low testosterone  Refused BOB  Symptoms fatigue, decreased libido  Repeat testosterone level 7-11 am draw  Healthy diet, exercise,  Continue to wear CPAP every night  Continue vitamin D and magnesium    Orders:    Testosterone, free, total; Future    Ambulatory Referral to Urology    Today we discussed the diagnosis of low testosterone.  He understands this can only be made after a total of two low testosterone measurements have been obtained on two separate occasions.  We talked through the basic hypothalamic pituitary gonadal axis hormone pathway today. We discussed the hormone panel of testing that will be performed.     We risk factors, lifestyle and behavioral impacts.      We touched upon the fact that weight gain, marijuana and opiate use can contribute to holding extra estrogen causing a relative low testosterone. Additionally, we discussed the goal of therapy is not only to get to a normal physiologic testosterone  but also the relief of symptoms.we discussed the fact that he does have some fatigue, but overall doesn't feel bad.  States he did gain 30 pounds recently when stopping injectable tirzepatide.  We then reviewed risks and benefits of testosterone supplementation including the potential that supplementation will not reverse his symptoms at which  point treatment may be discontinued in the future.  We reviewed increased cardiac risk, small but present increased risk prostate cancer, and potential risk of polycythemia and possible need for phlebotomy.     Additional side effects including the following:  Acne  Weight gain  Worsen sleep apnea  Testicular atrophy  Infertility  Behavior changes/aggression/moodiness  Growth of breast tissue and breast cancers      Discussed testosterone replacement options  Transdermal  IM injection  Oral (clomid or buccal testosterone)  Subcutaneous implants    We discussed the fact that oral testosterone is not recommended by [facility] as it is associated with liver toxicity, abnormal liver function tests and jaundice.Also discussed tolerance and titration of medication with labwork on a regular basis to assess numbers/response in conjunction with symptom response.I have offered him to seek out endocrinology referral for additional workup and treatment of hormones.  Overall, he is unsure that he would want to start supplementation due to the fact that he is not feeling terrible as well as possible side effects.   he understands we would need to repeat testosterone level, 7-11 AM draw in 6 months from first..  He will return in August.  He understands that he is overdue for BOB.  He would prefer to have this with Dr. D'Amico, his primary urologist.  He is willing to get ultrasound, kidneys and bladder.  Will do this before return appointment.  All questions were answered he understands and agrees with the plan        We we discussed his renal cyst which was stable and benignWe discussed surveillance of kidney stones will get US kidneys and bladder now for surveillance and yearly.        History of Present Illness   Cachorro Gray is a 59 y.o. male PMH BPH with nocturia and kidney stones.   who presents for low testosterone.   At last visit, multiple kidney stones noted CT scan form 1/2024.  No recent imaging.  Last PSA   3/26/25--0.67.  Patient states that overall he has some decreased energy, does not feel bad overall.  Is here because he was recommended to come due to the lab value of low testosterone of 231 In February, 2025.      Erections ok  Libido: decreased   Fatigue/Energy: Yes  Muscle mass  Sleep apnea: Yes, uses CPAP every night  Marijuana: none  Alcohol: yes  Tobacco: cigars  Opiates: none  Recent testosterone level on file 231 on February, 2025            Review of Systems   Constitutional:  Positive for fatigue. Negative for activity change, chills and fever.   HENT: Negative.     Eyes: Negative.    Respiratory:  Negative for shortness of breath.    Gastrointestinal:  Negative for abdominal pain, nausea and vomiting.   Endocrine: Negative.    Genitourinary:  Negative for difficulty urinating, dysuria, flank pain, hematuria and urgency.   Musculoskeletal:  Negative for back pain and neck pain.   Skin: Negative.    Allergic/Immunologic: Negative.    Neurological:  Negative for dizziness and headaches.   Hematological: Negative.    Psychiatric/Behavioral: Negative.  Negative for behavioral problems.           Objective   There were no vitals taken for this visit.    Physical Exam  Vitals and nursing note reviewed.   Constitutional:       General: He is not in acute distress.     Appearance: Normal appearance. He is well-developed. He is not ill-appearing.   HENT:      Head: Normocephalic and atraumatic.      Right Ear: External ear normal.      Left Ear: External ear normal.      Nose: Nose normal.   Eyes:      Extraocular Movements: Extraocular movements intact.      Pupils: Pupils are equal, round, and reactive to light.   Cardiovascular:      Rate and Rhythm: Normal rate and regular rhythm.   Pulmonary:      Effort: Pulmonary effort is normal. No respiratory distress.   Abdominal:      Palpations: Abdomen is soft.      Tenderness: There is no abdominal tenderness. There is no right CVA tenderness, left CVA tenderness  or guarding.   Genitourinary:     Comments: Refused    Musculoskeletal:         General: No swelling. Normal range of motion.      Cervical back: Normal range of motion and neck supple.   Skin:     General: Skin is warm and dry.   Neurological:      General: No focal deficit present.      Mental Status: He is alert and oriented to person, place, and time. Mental status is at baseline.   Psychiatric:         Mood and Affect: Mood normal.         Behavior: Behavior normal.         Thought Content: Thought content normal.         Judgment: Judgment normal.          Results   Lab Results   Component Value Date    PSA 0.697 03/26/2025    PSA 0.2 08/16/2023    PSA 0.5 07/22/2022     Lab Results   Component Value Date    CALCIUM 9.7 01/17/2024    K 4.7 01/17/2024    CO2 27 01/17/2024     01/17/2024    BUN 17 01/17/2024    CREATININE 0.74 01/17/2024     Lab Results   Component Value Date    WBC 9.6 08/16/2023    HGB 15.2 08/16/2023    HCT 44.6 08/16/2023    MCV 94.1 08/16/2023     08/16/2023       Office Urine Dip  No results found for this or any previous visit (from the past hour).

## 2025-04-04 ENCOUNTER — OFFICE VISIT (OUTPATIENT)
Dept: UROLOGY | Facility: CLINIC | Age: 60
End: 2025-04-04
Payer: COMMERCIAL

## 2025-04-04 VITALS
WEIGHT: 309 LBS | DIASTOLIC BLOOD PRESSURE: 68 MMHG | HEIGHT: 69 IN | OXYGEN SATURATION: 97 % | BODY MASS INDEX: 45.77 KG/M2 | HEART RATE: 88 BPM | SYSTOLIC BLOOD PRESSURE: 126 MMHG | TEMPERATURE: 98.4 F

## 2025-04-04 DIAGNOSIS — N40.1 BPH ASSOCIATED WITH NOCTURIA: ICD-10-CM

## 2025-04-04 DIAGNOSIS — R79.89 LOW TESTOSTERONE: ICD-10-CM

## 2025-04-04 DIAGNOSIS — R35.1 BPH ASSOCIATED WITH NOCTURIA: ICD-10-CM

## 2025-04-04 DIAGNOSIS — N20.0 RENAL STONES: Primary | ICD-10-CM

## 2025-04-04 DIAGNOSIS — N28.1 RENAL CYST: ICD-10-CM

## 2025-04-04 PROCEDURE — 99214 OFFICE O/P EST MOD 30 MIN: CPT

## 2025-04-09 DIAGNOSIS — E11.42 TYPE 2 DIABETES MELLITUS WITH DIABETIC POLYNEUROPATHY, WITHOUT LONG-TERM CURRENT USE OF INSULIN (HCC): Primary | ICD-10-CM

## 2025-04-09 RX ORDER — BLOOD SUGAR DIAGNOSTIC
STRIP MISCELLANEOUS 3 TIMES DAILY
COMMUNITY
Start: 2025-04-09 | End: 2025-04-09 | Stop reason: SDUPTHER

## 2025-04-09 RX ORDER — BLOOD SUGAR DIAGNOSTIC
1 STRIP MISCELLANEOUS 3 TIMES DAILY
Qty: 300 STRIP | Refills: 2 | Status: SHIPPED | OUTPATIENT
Start: 2025-04-09

## 2025-05-14 ENCOUNTER — APPOINTMENT (OUTPATIENT)
Dept: LAB | Facility: MEDICAL CENTER | Age: 60
End: 2025-05-14
Attending: INTERNAL MEDICINE
Payer: COMMERCIAL

## 2025-05-14 DIAGNOSIS — R10.84 GENERALIZED ABDOMINAL PAIN: ICD-10-CM

## 2025-05-14 DIAGNOSIS — R73.03 PREDIABETES: ICD-10-CM

## 2025-05-14 DIAGNOSIS — R79.89 LOW TESTOSTERONE: ICD-10-CM

## 2025-05-14 DIAGNOSIS — E03.9 HYPOTHYROIDISM, UNSPECIFIED TYPE: ICD-10-CM

## 2025-05-14 LAB
AMYLASE SERPL-CCNC: 34 IU/L (ref 29–103)
CHOLEST SERPL-MCNC: 151 MG/DL (ref ?–200)
EST. AVERAGE GLUCOSE BLD GHB EST-MCNC: 180 MG/DL
HBA1C MFR BLD: 7.9 %
HDLC SERPL-MCNC: 39 MG/DL
LDLC SERPL CALC-MCNC: 82 MG/DL (ref 0–100)
LIPASE SERPL-CCNC: 17 U/L (ref 11–82)
TRIGL SERPL-MCNC: 149 MG/DL (ref ?–150)
TSH SERPL DL<=0.05 MIU/L-ACNC: 2.99 UIU/ML (ref 0.45–4.5)

## 2025-05-14 PROCEDURE — 82150 ASSAY OF AMYLASE: CPT

## 2025-05-14 PROCEDURE — 84443 ASSAY THYROID STIM HORMONE: CPT

## 2025-05-14 PROCEDURE — 86800 THYROGLOBULIN ANTIBODY: CPT

## 2025-05-14 PROCEDURE — 36415 COLL VENOUS BLD VENIPUNCTURE: CPT

## 2025-05-14 PROCEDURE — 80061 LIPID PANEL: CPT

## 2025-05-14 PROCEDURE — 83036 HEMOGLOBIN GLYCOSYLATED A1C: CPT

## 2025-05-14 PROCEDURE — 86376 MICROSOMAL ANTIBODY EACH: CPT

## 2025-05-14 PROCEDURE — 83690 ASSAY OF LIPASE: CPT

## 2025-05-16 LAB
THYROGLOB AB SERPL-ACNC: <1 IU/ML (ref 0–0.9)
THYROPEROXIDASE AB SERPL-ACNC: 11 IU/ML (ref 0–34)

## 2025-05-19 ENCOUNTER — TELEPHONE (OUTPATIENT)
Dept: UROLOGY | Facility: CLINIC | Age: 60
End: 2025-05-19

## 2025-05-20 ENCOUNTER — OFFICE VISIT (OUTPATIENT)
Dept: FAMILY MEDICINE CLINIC | Facility: CLINIC | Age: 60
End: 2025-05-20
Payer: COMMERCIAL

## 2025-05-20 VITALS
SYSTOLIC BLOOD PRESSURE: 128 MMHG | TEMPERATURE: 97.2 F | WEIGHT: 309.4 LBS | HEIGHT: 69 IN | DIASTOLIC BLOOD PRESSURE: 70 MMHG | OXYGEN SATURATION: 98 % | HEART RATE: 70 BPM | BODY MASS INDEX: 45.83 KG/M2 | RESPIRATION RATE: 18 BRPM

## 2025-05-20 DIAGNOSIS — M25.562 ACUTE PAIN OF LEFT KNEE: ICD-10-CM

## 2025-05-20 DIAGNOSIS — E11.00 TYPE 2 DIABETES MELLITUS WITH HYPEROSMOLARITY WITHOUT COMA, WITHOUT LONG-TERM CURRENT USE OF INSULIN (HCC): Primary | ICD-10-CM

## 2025-05-20 PROCEDURE — 99214 OFFICE O/P EST MOD 30 MIN: CPT | Performed by: INTERNAL MEDICINE

## 2025-05-20 NOTE — PROGRESS NOTES
Name: Cachorro Gray      : 1965      MRN: [id number]  Encounter Provider: Jael Krishnamurthy DO  Encounter Date: 2025   Encounter department: [facility]  :  Assessment & Plan  Type 2 diabetes mellitus with hyperosmolarity without coma, without long-term current use of insulin (HCC)    Lab Results   Component Value Date    HGBA1C 7.9 (H) 2025   Pt agrees to trial Jardiance as long as coverage   Discussed lo carb diet   He will pursue ortho followup which may help improve activity level     Orders:  •  Hemoglobin A1C; Future  •  Empagliflozin (JARDIANCE) 10 MG TABS tablet; Take 1 tablet (10 mg total) by mouth daily  Recheck A1c 3 months   Acute pain of left knee    Orders:  •  Ambulatory Referral to Orthopedic Surgery; Future  •  XR knee 3 vw left non injury; Future  •  XR knee 4+ vw right injury; Future  Referral for eval of progressive lower leg/knee pain He benefitted from injections in past Xrays ordered     3 months with labs      History of Present Illness   HPI  Pt aware of labs and increased A1c He has been off medication for sometime now He has been limited with mobility due to leg pain mostly around and behind left knee He has hx of meniscal issues and had injections years ago with benefit No trauma He has not had imaging or eval for knees for many years but he cannot do outdoor activities this year due to sxs Bs have been 200 range and diet fair control He did not tolerate ozempic due to nausea and did not tolerate Metformin   Review of Systems   Constitutional:  Positive for activity change. Negative for chills and fever.   HENT: Negative.     Eyes:  Negative for visual disturbance.   Respiratory:  Negative for cough and shortness of breath.    Cardiovascular:  Negative for chest pain, palpitations and leg swelling.   Gastrointestinal:  Negative for abdominal distention and abdominal pain.   Genitourinary: Negative.    Musculoskeletal:  Positive for arthralgias, back  pain and gait problem.   Neurological:  Negative for dizziness, light-headedness and headaches.   Psychiatric/Behavioral:  Negative for sleep disturbance. The patient is not nervous/anxious.      Past Medical History:   Diagnosis Date   • Chronic bilateral low back pain without sciatica    • Cigar smoker    • Fatty liver    • Hypertension    • Memory change    • Mixed hyperlipidemia    • Non-insulin dependent type 2 diabetes mellitus (HCC)    • Obstructive sleep apnea      Past Surgical History:   Procedure Laterality Date   • C.TRACHOMATIS, N. GONORRHOEAE, T. VAGINALIS, POWER (HISTORICAL) Bilateral 2001   • INGUINAL HERNIA REPAIR Bilateral 1988   • KNEE CARTILAGE SURGERY Bilateral 2018    twice on the left and once on the right   • NERVE REPAIR Left     left index finger nerve repair   • TONSILECTOMY AND ADNOIDECTOMY  1968     Social History     Socioeconomic History   • Marital status: /Civil Union     Spouse name: Not on file   • Number of children: Not on file   • Years of education: Not on file   • Highest education level: Not on file   Occupational History   • Not on file   Tobacco Use   • Smoking status: Some Days     Types: Cigars   • Smokeless tobacco: Never   Vaping Use   • Vaping status: Never Used   Substance and Sexual Activity   • Alcohol use: Not Currently   • Drug use: Never   • Sexual activity: Yes     Partners: Female     Comment:    Other Topics Concern   • Not on file   Social History Narrative   • Not on file     Social Drivers of Health     Financial Resource Strain: Low Risk  (9/30/2022)    Overall Financial Resource Strain (CARDIA)    • Difficulty of Paying Living Expenses: Not hard at all   Food Insecurity: No Food Insecurity (9/30/2022)    Hunger Vital Sign    • Worried About Running Out of Food in the Last Year: Never true    • Ran Out of Food in the Last Year: Never true   Transportation Needs: No Transportation Needs (9/30/2022)    PRAPARE - Transportation    • Lack of  "Transportation (Medical): No    • Lack of Transportation (Non-Medical): No   Physical Activity: Inactive (9/30/2022)    Exercise Vital Sign    • Days of Exercise per Week: 5 days    • Minutes of Exercise per Session: 0 min   Stress: No Stress Concern Present (9/30/2022)    [employer] - Occupational Stress Questionnaire    • Feeling of Stress : Not at all   Social Connections: Unknown (6/18/2024)    Received from [facility] [employer]    Social Connections    • How often do you feel lonely or isolated from those around you? (Adult - for ages 18 years and over): Not on file   Intimate Partner Violence: Not At Risk (9/30/2022)    Humiliation, Afraid, Rape, and Kick questionnaire    • Fear of Current or Ex-Partner: No    • Emotionally Abused: No    • Physically Abused: No    • Sexually Abused: No   Housing Stability: Low Risk  (9/30/2022)    Housing Stability Vital Sign    • Unable to Pay for Housing in the Last Year: No    • Number of Places Lived in the Last Year: 1    • Unstable Housing in the Last Year: No     Allergies   Allergen Reactions   • Ozempic (0.25 Or 0.5 Mg-Dose) [Semaglutide(0.25 Or 0.5mg-Dos)] Abdominal Pain     pancreatitis       Objective   /70   Pulse 70   Temp (!) 97.2 °F (36.2 °C)   Resp 18   Ht 5' 9\" (1.753 m)   Wt (!) 140 kg (309 lb 6.4 oz)   BMI 45.69 kg/m²      Physical Exam  Vitals and nursing note reviewed.   Constitutional:       General: He is not in acute distress.     Appearance: Normal appearance. He is not ill-appearing, toxic-appearing or diaphoretic.   HENT:      Head: Normocephalic and atraumatic.      Right Ear: External ear normal.      Left Ear: External ear normal.      Nose: Nose normal.      Mouth/Throat:      Mouth: Mucous membranes are moist.     Eyes:      General: No scleral icterus.      Cardiovascular:      Rate and Rhythm: Normal rate and regular rhythm.      Pulses: Normal pulses.   Pulmonary:      Effort: Pulmonary effort is normal. No " respiratory distress.      Breath sounds: Normal breath sounds. No wheezing.   Abdominal:      General: There is no distension.      Palpations: Abdomen is soft.      Tenderness: There is no abdominal tenderness.     Musculoskeletal:         General: Deformity present. Normal range of motion.      Cervical back: Normal range of motion and neck supple.      Right lower leg: No edema.      Left lower leg: No edema.   Lymphadenopathy:      Cervical: No cervical adenopathy.     Skin:     General: Skin is dry.      Coloration: Skin is not jaundiced or pale.     Neurological:      General: No focal deficit present.      Mental Status: He is alert and oriented to person, place, and time. Mental status is at baseline.      Gait: Gait abnormal.     Psychiatric:         Mood and Affect: Mood normal.         Behavior: Behavior normal.         Thought Content: Thought content normal.         Judgment: Judgment normal.

## 2025-05-28 ENCOUNTER — APPOINTMENT (OUTPATIENT)
Dept: RADIOLOGY | Facility: MEDICAL CENTER | Age: 60
End: 2025-05-28
Payer: COMMERCIAL

## 2025-05-28 VITALS
RESPIRATION RATE: 16 BRPM | WEIGHT: 305 LBS | HEART RATE: 83 BPM | BODY MASS INDEX: 45.18 KG/M2 | HEIGHT: 69 IN | OXYGEN SATURATION: 96 % | TEMPERATURE: 97.6 F

## 2025-05-28 DIAGNOSIS — M17.0 PRIMARY OSTEOARTHRITIS OF BOTH KNEES: Primary | ICD-10-CM

## 2025-05-28 DIAGNOSIS — M25.562 ACUTE PAIN OF LEFT KNEE: ICD-10-CM

## 2025-05-28 PROBLEM — M17.12 PRIMARY OSTEOARTHRITIS OF LEFT KNEE: Status: ACTIVE | Noted: 2025-05-28

## 2025-05-28 PROCEDURE — 99244 OFF/OP CNSLTJ NEW/EST MOD 40: CPT | Performed by: STUDENT IN AN ORGANIZED HEALTH CARE EDUCATION/TRAINING PROGRAM

## 2025-05-28 PROCEDURE — 73564 X-RAY EXAM KNEE 4 OR MORE: CPT

## 2025-05-28 NOTE — PROGRESS NOTES
Assessment & Plan  Primary osteoarthritis of both knees    Orders:    Injection Procedure Prior Authorization; Future    Cachorro Gray is a 60 y.o. year old male with chronic bilateral knee pain due to bilateral knee osteoarthritis.      We had a shared medical decision making discussion with the patient regarding their diagnosis of knee arthritis  We reviewed the imaging and physical exam findings together that confirm the diagnosis of  Bilateral knee arthritis.   We discussed the following treatments, and included the risks and benefits of each.   Lifestyle modifications, which include diet, exercise, and health changes, as well as changes in activities.  The benefits are clear of overall health improvements and there is minimal risk.   Physical therapy, which has been shown in several studies to be beneficial in reducing pain and improving function, but does not repair the cartilage.    Use of over-the-counter, non-narcotic pain relievers and anti-inflammatory medications are usually the first choice of therapy for arthritis of the knee. I think the most effective medications are nonsteroidal anti-inflammatory drugs, or NSAIDs. NSAIDs, such as ibuprofen, celebrex, and naproxen, are available both over-the-counter and by prescription and help by decreasing inflammation.  Injections such as steroid and viscosupplementation--both of which can help in patients with OA, but have the limitation of an overall small effect size.  We also discussed 'biologics' including PRP and stem cell injections. There is limited data right now to support the use of these treatment strategies, but some evidence that PRP can improve pain and function better than steroid injections.   Finally, we discussed surgical options, including arthroscopy, debridement, and osteotomies, Cachorro Gray is not a candidate for these given the extent of their disease. We discussed the role of meeting with a total knee arthroplasty surgeon if  the patient fails to improve with non-operative treatment.    At this point, the patient wishes to proceed with bilateral Visco injection. We discussed the authorization process of visco injections in clinic today. Patient will follow up after authorization is completed.       General  Chief Complaint   Patient presents with    Left Knee - Pain     Pain from the back of the knee down to the calf also the thigh area    Right Knee - Pain        Subjective    Cachorro Gray is a 60 y.o. male who presents with LEFT knee pain:    Chief Complaint   Patient presents with    Left Knee - Pain     Pain from the back of the knee down to the calf also the thigh area    Right Knee - Pain          History of Present Illness   The patient complains of left knee pain. The pain started several months ago without injury.    The pain is 8/10. The pain is located Medial and Posterior. The pain is described as aching and shooting. They have tried cortisone injections with minimal relief, visco injections with 5+ years of relief, castor oil for their problem. Pain improves with rest. Pain worsens with bending, standing after prolonged sitting. Patient complains of stiffness.     The patient does not have swelling. The patient does endorse some buckling and giving out. Patient does have some popping and clicking in the knee. The patient does not feel unstable. Patient states he does have a history of meniscus tear of the left knee managed surgically over 10 years ago.     Ortho Sports Medicine Patient Answers  Failed to redirect to the Timeline version of the [employer] SmartLink.  Allergies[1]  Encounter Medications[2]  Past Medical History[3]  Past Surgical History[4]  Family History[5]  Social History[6]        Objective      Vitals:    05/28/25 1025   Pulse: 83   Resp: 16   Temp: 97.6 °F (36.4 °C)   SpO2: 96%     Body mass index is 45.04 kg/m².  Physical Exam  Knee Exam     bilateral   Inspection: Incision healed without signs of  infections   Gait: Antalgic   Quadriceps atrophy: None   Tenderness: Medial Joint Line   ROM: 0-120   Effusion: None   Meniscus Exam   bilateral   Medial Meniscus: Joint Line Tenderness   Lateral Meniscus: None   Ligament Exam   Right   ACL Lachman: negative    Anterior Drawer:  negative   PCL Posterior Drawer:negative       MCL Stable at 0 and 30 degrees of flexion   LCL Stable at 0 and 30 degrees of flexion   PLC Negative     Patellofemoral exam   bilateral   Patella grind test negative   Patella instability: negative  1 Quadrants of translation   Patellar Translation Apprehension negative     Distally the patient's neurovascular status is normal.    Review of Prior Testing  I independently interpreted the following test: X-rays of the bilateral knee taken today, including weight bearing and merchant views.  Multiple views of the knee show changes consistent with advanced arthritis.             Follow Up: Return for bilateral Visco injection.    All questions answered and patient agrees with plan.            [1]   Allergies  Allergen Reactions    Ozempic (0.25 Or 0.5 Mg-Dose) [Semaglutide(0.25 Or 0.5mg-Dos)] Abdominal Pain     pancreatitis   [2]   Outpatient Encounter Medications as of 5/28/2025   Medication Sig Dispense Refill    amlodipine-olmesartan (MANAS) 5-20 MG Take 1 tablet by mouth daily 90 tablet 3    Cholecalciferol 50 MCG (2000 UT) CAPS Take 1 capsule (2,000 Units total) by mouth daily 90 capsule 3    ELDERBERRY PO Take by mouth in the morning.      Empagliflozin (JARDIANCE) 10 MG TABS tablet Take 1 tablet (10 mg total) by mouth daily 90 tablet 0    glucose blood (Accu-Chek Guide Test) test strip Use 1 each 3 (three) times a day to test blood sugars. 300 strip 2    Magnesium 100 MG CAPS Take 1 capsule by mouth in the morning.      rosuvastatin (CRESTOR) 40 MG tablet Take 1 tablet (40 mg total) by mouth daily with dinner 90 tablet 3    tamsulosin (FLOMAX) 0.4 mg Take 1 capsule (0.4 mg total) by mouth  daily with dinner 90 capsule 3    Zinc 10 MG LOZG Apply to the mouth or throat       No facility-administered encounter medications on file as of 2025.   [3]   Past Medical History:  Diagnosis Date    Arthritis     Back pain     Herniated disc l4/l5 several times since     BPH (benign prostatic hypertrophy)     Chronic bilateral low back pain without sciatica     Cigar smoker     Diabetes mellitus (HCC) 2017    Disease of thyroid gland     Fatty liver     GERD (gastroesophageal reflux disease)     No longer suffer from this. Stopped medication    Hypertension     Kidney problem     Stones    Kidney stone     Memory change     Mixed hyperlipidemia     Non-insulin dependent type 2 diabetes mellitus (HCC)     Obstructive sleep apnea     Osteoarthritis     Pneumonia    [4]   Past Surgical History:  Procedure Laterality Date    C.TRACHOMATIS, N. GONORRHOEAE, T. VAGINALIS, POWER (HISTORICAL) Bilateral     HAND SURGERY      INGUINAL HERNIA REPAIR Bilateral     KNEE CARTILAGE SURGERY Bilateral 2018    twice on the left and once on the right    NERVE REPAIR Left     left index finger nerve repair    TONSILECTOMY AND ADNOIDECTOMY     [5]   Family History  Problem Relation Name Age of Onset    Stroke Mother Carolyn Moore          in  after suffering 2 strokes    Cancer Mother Carolyn Moore     Heart disease Father Napoleon Zurita          in  at age 49 after undergoing heart testing   [6]   Social History  Tobacco Use    Smoking status: Light Smoker     Current packs/day: 0.00     Types: Cigars, Cigarettes     Last attempt to quit: 2025    Smokeless tobacco: Never    Tobacco comments:     Use to be a 3 cigar/day. Only 1-3/week since I retired in December   Vaping Use    Vaping status: Never Used   Substance Use Topics    Alcohol use: Not Currently    Drug use: Not Currently

## 2025-06-25 ENCOUNTER — TELEPHONE (OUTPATIENT)
Age: 60
End: 2025-06-25

## 2025-06-25 NOTE — TELEPHONE ENCOUNTER
Caller: [facility]    Doctor: Dr. Lea    Reason for call: Calling to verify shipping address for injection to be delivered tomorrow 6/26/25     [address] [facility] [address] address was given     Call back#:  [phone]

## 2025-07-02 VITALS
BODY MASS INDEX: 44.55 KG/M2 | TEMPERATURE: 97.1 F | RESPIRATION RATE: 16 BRPM | OXYGEN SATURATION: 96 % | WEIGHT: 300.8 LBS | HEART RATE: 87 BPM | HEIGHT: 69 IN

## 2025-07-02 DIAGNOSIS — M17.0 PRIMARY OSTEOARTHRITIS OF BOTH KNEES: Primary | ICD-10-CM

## 2025-07-02 PROCEDURE — 20610 DRAIN/INJ JOINT/BURSA W/O US: CPT | Performed by: STUDENT IN AN ORGANIZED HEALTH CARE EDUCATION/TRAINING PROGRAM

## 2025-07-02 NOTE — PROGRESS NOTES
Patient is here for his bilateral knee Durolane Injections.     Patient rates their pain as 7/10 today along the anterior and medial.    Pain is worse with any weight bearing, going up and down stairs, squatting, and walking    Physical exam of the knee shows no effusion no ecchymosis.    Patient has attempted physical therapy and cortisone outpatient with minimal improvement of their symptoms.      Large joint arthrocentesis: bilateral knee    Performed by: Geovanny Lea MD  Authorized by: Geovanny Lea MD    Universal Protocol:  Consent: Verbal consent obtained  Risks and benefits: risks, benefits and alternatives were discussed  Consent given by: patient  Patient understanding: patient states understanding of the procedure being performed  Radiology Images displayed and confirmed. If images not available, report reviewed: imaging studies available  Supporting Documentation  Indications: pain and joint swelling     Is this a Visco injection? Yes  Non-Pharmacologic Treatments Attempted: Diet, Home Exercise, Physical Therapy and Weight Management Counseling  Pharmacologic Treatments Attempted: Prior CSI  Pain Score: 7Procedure Details  Location: knee - bilateral knee  Preparation: Patient was prepped and draped in the usual sterile fashion  Needle size: 21 G  Ultrasound guidance: no  Approach: superior    Medications (Right): 3 mL sodium hyaluronate 60 MG/3MLSpecialty Pharmacy Supplied (Right): for right side  Medications (Left): 3 mL sodium hyaluronate 60 MG/3ML   Specialty Pharmacy Supplied (Left): for left side  Patient tolerance: patient tolerated the procedure well with no immediate complications  Dressing:  Sterile dressing applied          Patient tolerated the procedure well and all questions concerns were answered.  Follow-up in 6 weeks.

## 2025-07-14 VITALS
BODY MASS INDEX: 44.97 KG/M2 | WEIGHT: 303.6 LBS | HEIGHT: 69 IN | TEMPERATURE: 98.7 F | RESPIRATION RATE: 16 BRPM | HEART RATE: 64 BPM | OXYGEN SATURATION: 97 %

## 2025-07-14 DIAGNOSIS — M17.0 PRIMARY OSTEOARTHRITIS OF BOTH KNEES: Primary | ICD-10-CM

## 2025-07-14 PROCEDURE — 99214 OFFICE O/P EST MOD 30 MIN: CPT | Performed by: STUDENT IN AN ORGANIZED HEALTH CARE EDUCATION/TRAINING PROGRAM

## 2025-07-14 RX ORDER — MELOXICAM 15 MG/1
15 TABLET ORAL DAILY
Qty: 14 TABLET | Refills: 0 | Status: SHIPPED | OUTPATIENT
Start: 2025-07-14

## 2025-07-14 NOTE — PROGRESS NOTES
Assessment & Plan  Primary osteoarthritis of both knees    Orders:    meloxicam (Mobic) 15 mg tablet; Take 1 tablet (15 mg total) by mouth daily    Cachorro Gray is a 60 y.o. year old male with chronic bilateral knee pain due to osteoarthritis of both knees. Discussed with patient that with gel injections he may experience rebound pain before he feels improvement. Discussed that in the coming weeks he should start feeling improvement. Discussed he can try topical Voltaren which may help. A prescription for Meloxicam was sent in for patient. He is to follow up in 3-4 weeks as scheduled.  There is no concern or signs of infection on exam.        Subjective    History of Present Illness   Cachorro Gray present for follow up of bilateral knee pain. States since he received the gel injections on 7/2/2025 the pain in both knees have gotten worse. States he cannot bend his knees well. Is experiencing new right knee pain since getting the injections. States the knees feel like they will give out. Denies any fever, chills, body aches,       Objective      Vitals:    07/14/25 1448   Pulse: 64   Resp: 16   Temp: 98.7 °F (37.1 °C)   SpO2: 97%     Body mass index is 44.83 kg/m².  Physical Exam  Knee Exam     bilateral   Inspection: Skin is intact there is no erythema or skin breakdown   Gait: Antalgic   Quadriceps atrophy: None   Tenderness: Medial Joint Line   ROM: 0-110   Effusion: None   Meniscus Exam   right   Medial Meniscus: Joint Line Tenderness   Lateral Meniscus: None   Ligament Exam   bilateral   ACL Lachman: negative    Anterior Drawer:  negative   PCL Posterior Drawer:negative       MCL Stable at 0 and 30 degrees of flexion   LCL Stable at 0 and 30 degrees of flexion   PLC Deferred     Patellofemoral exam   bilateral   Patella grind test negative   Patella instability: negative  1 Quadrants of translation   Patellar Translation Apprehension negative     Distally the patient's neurovascular status is  normal.    Review of Prior Testing  I independently interpreted the following test(s) in PACS:     X-rays of the bilateral knee taken 5/28/2025, which demonstrates tricompartmental degenerative changes bilaterally, mostly medial.       Follow Up: as previously scheduled.     All questions answered and patient agrees with plan.     Scribe Attestation      I,:  Manuel Munson PA-C am acting as a scribe while in the presence of the attending physician.:       I,:  Geovanny Lea MD personally performed the services described in this documentation    as scribed in my presence.:

## 2025-07-28 DIAGNOSIS — E11.00 TYPE 2 DIABETES MELLITUS WITH HYPEROSMOLARITY WITHOUT COMA, WITHOUT LONG-TERM CURRENT USE OF INSULIN (HCC): ICD-10-CM

## 2025-07-30 RX ORDER — EMPAGLIFLOZIN 10 MG/1
10 TABLET, FILM COATED ORAL DAILY
Qty: 90 TABLET | Refills: 0 | Status: SHIPPED | OUTPATIENT
Start: 2025-07-30

## 2025-08-20 ENCOUNTER — OFFICE VISIT (OUTPATIENT)
Dept: FAMILY MEDICINE CLINIC | Facility: CLINIC | Age: 60
End: 2025-08-20
Payer: COMMERCIAL

## 2025-08-20 VITALS
WEIGHT: 301 LBS | TEMPERATURE: 97.8 F | BODY MASS INDEX: 44.58 KG/M2 | DIASTOLIC BLOOD PRESSURE: 78 MMHG | HEIGHT: 69 IN | OXYGEN SATURATION: 94 % | SYSTOLIC BLOOD PRESSURE: 124 MMHG | HEART RATE: 68 BPM | RESPIRATION RATE: 18 BRPM

## 2025-08-20 DIAGNOSIS — M17.0 PRIMARY OSTEOARTHRITIS OF BOTH KNEES: ICD-10-CM

## 2025-08-20 DIAGNOSIS — I10 PRIMARY HYPERTENSION: Primary | ICD-10-CM

## 2025-08-20 DIAGNOSIS — E11.42 TYPE 2 DIABETES MELLITUS WITH DIABETIC POLYNEUROPATHY, WITHOUT LONG-TERM CURRENT USE OF INSULIN (HCC): ICD-10-CM

## 2025-08-20 DIAGNOSIS — E11.00 TYPE 2 DIABETES MELLITUS WITH HYPEROSMOLARITY WITHOUT COMA, WITHOUT LONG-TERM CURRENT USE OF INSULIN (HCC): ICD-10-CM

## 2025-08-20 DIAGNOSIS — E53.8 VITAMIN B 12 DEFICIENCY: ICD-10-CM

## 2025-08-20 PROCEDURE — 99214 OFFICE O/P EST MOD 30 MIN: CPT | Performed by: INTERNAL MEDICINE
